# Patient Record
Sex: FEMALE | Race: WHITE | NOT HISPANIC OR LATINO | Employment: UNEMPLOYED | URBAN - METROPOLITAN AREA
[De-identification: names, ages, dates, MRNs, and addresses within clinical notes are randomized per-mention and may not be internally consistent; named-entity substitution may affect disease eponyms.]

---

## 2017-04-07 ENCOUNTER — HOSPITAL ENCOUNTER (INPATIENT)
Facility: HOSPITAL | Age: 27
LOS: 4 days | Discharge: HOME | End: 2017-04-11
Attending: SPECIALIST | Admitting: SPECIALIST

## 2017-04-07 ENCOUNTER — HOSPITAL ENCOUNTER (EMERGENCY)
Facility: HOSPITAL | Age: 27
End: 2017-04-07
Attending: EMERGENCY MEDICINE | Admitting: EMERGENCY MEDICINE

## 2017-04-07 VITALS
OXYGEN SATURATION: 98 % | TEMPERATURE: 98.1 F | RESPIRATION RATE: 16 BRPM | DIASTOLIC BLOOD PRESSURE: 65 MMHG | HEIGHT: 63 IN | WEIGHT: 190 LBS | HEART RATE: 75 BPM | SYSTOLIC BLOOD PRESSURE: 103 MMHG | BODY MASS INDEX: 33.66 KG/M2

## 2017-04-07 DIAGNOSIS — T19.2XXA RETAINED TAMPON, INITIAL ENCOUNTER: ICD-10-CM

## 2017-04-07 DIAGNOSIS — R45.851 DEPRESSION WITH SUICIDAL IDEATION: Primary | ICD-10-CM

## 2017-04-07 DIAGNOSIS — F32.A DEPRESSION WITH SUICIDAL IDEATION: Primary | ICD-10-CM

## 2017-04-07 PROBLEM — F33.2 SEVERE RECURRENT MAJOR DEPRESSION WITHOUT PSYCHOTIC FEATURES (HCC): Status: ACTIVE | Noted: 2017-04-07

## 2017-04-07 LAB
AMPHET+METHAMPHET UR QL: NEGATIVE
BARBITURATES UR QL SCN: NEGATIVE
BENZODIAZ UR QL SCN: NEGATIVE
CANNABINOIDS SERPL QL: POSITIVE
COCAINE UR QL: NEGATIVE
ETHANOL BLD-MCNC: <10 MG/DL (ref 0–10)
ETHANOL UR QL: <0.01 %
HCG SERPL QL: NEGATIVE
HOLD SPECIMEN: NORMAL
METHADONE UR QL SCN: NEGATIVE
OPIATES UR QL: NEGATIVE
OXYCODONE UR QL SCN: NEGATIVE

## 2017-04-07 RX ORDER — CETIRIZINE HYDROCHLORIDE 10 MG/1
10 TABLET ORAL DAILY
COMMUNITY

## 2017-04-07 RX ORDER — LORAZEPAM 1 MG/1
2 TABLET ORAL
Status: DISCONTINUED | OUTPATIENT
Start: 2017-04-07 | End: 2017-04-11 | Stop reason: HOSPADM

## 2017-04-07 RX ORDER — ACETAMINOPHEN 325 MG/1
650 TABLET ORAL EVERY 4 HOURS PRN
Status: DISCONTINUED | OUTPATIENT
Start: 2017-04-07 | End: 2017-04-11 | Stop reason: HOSPADM

## 2017-04-07 RX ORDER — MAGNESIUM HYDROXIDE/ALUMINUM HYDROXICE/SIMETHICONE 120; 1200; 1200 MG/30ML; MG/30ML; MG/30ML
30 SUSPENSION ORAL EVERY 6 HOURS PRN
Status: DISCONTINUED | OUTPATIENT
Start: 2017-04-07 | End: 2017-04-11 | Stop reason: HOSPADM

## 2017-04-07 NOTE — ED PROVIDER NOTES
EMERGENCY DEPARTMENT ENCOUNTER    CHIEF COMPLAINT  Chief Complaint: Psychiatric Evaluation  History given by: Patient  History limited by:   Room Number: 04/04  PMD: No Known Provider      HPI:  Pt is a 27 y.o. female who presents for psychiatric evaluation. Pt reports that she has had SI for about 2 weeks ago. Pt reports a hx of SI, but reports this episode is worse. Pt denies any plan and previous self-injury. She reports a hx of depression. Pt reports that she has a tampon that got stuck sideways yesterday and she has been unable to remove with mild abd cramps. She denies any other medical complaint at this time.    Duration:  2 weeks  Onset: gradual  Timing: waxing and waning  Quality: suicidal ideation w/o plan  Intensity/Severity: moderate  Progression: worsening  Associated Symptoms: abd pain, depression  Aggravating Factors: none  Alleviating Factors: none  Previous Episodes: hx of depression and SI  Treatment before arrival: none    PAST MEDICAL HISTORY  Active Ambulatory Problems     Diagnosis Date Noted   • Severe recurrent major depression without psychotic features 04/07/2017     Resolved Ambulatory Problems     Diagnosis Date Noted   • No Resolved Ambulatory Problems     Past Medical History:   Diagnosis Date   • Depression        PAST SURGICAL HISTORY  History reviewed. No pertinent surgical history.    FAMILY HISTORY  Family History   Problem Relation Age of Onset   • Depression Mother    • Alcohol abuse Father    • Suicide Attempts Maternal Grandfather        SOCIAL HISTORY  Social History     Social History   • Marital status:      Spouse name: N/A   • Number of children: N/A   • Years of education: N/A     Occupational History   • Not on file.     Social History Main Topics   • Smoking status: Current Every Day Smoker     Packs/day: 0.50     Types: Cigarettes   • Smokeless tobacco: Not on file   • Alcohol use Yes      Comment: has recently significantly decreased her intake   • Drug use:  No   • Sexual activity: Yes     Partners: Male     Other Topics Concern   • Not on file     Social History Narrative   • No narrative on file       ALLERGIES  Review of patient's allergies indicates no known allergies.    REVIEW OF SYSTEMS  Review of Systems   Constitutional: Negative for fever.   HENT: Negative for sore throat.    Eyes: Negative.    Respiratory: Negative for cough and shortness of breath.    Cardiovascular: Negative for chest pain.   Gastrointestinal: Positive for abdominal pain. Negative for diarrhea and vomiting.   Genitourinary: Negative for dysuria.   Musculoskeletal: Negative for neck pain.   Skin: Negative for rash.   Allergic/Immunologic: Negative.    Neurological: Negative for weakness, numbness and headaches.   Hematological: Negative.    Psychiatric/Behavioral: Positive for suicidal ideas.        Depression   All other systems reviewed and are negative.      PHYSICAL EXAM  ED Triage Vitals   Temp Heart Rate Resp BP SpO2   04/07/17 1731 04/07/17 1731 04/07/17 1731 04/07/17 1739 04/07/17 1731   97.3 °F (36.3 °C) 89 18 116/84 99 %      Temp src Heart Rate Source Patient Position BP Location FiO2 (%)   04/07/17 1731 04/07/17 1731 -- -- --   Tympanic Monitor          Physical Exam   Constitutional: She is oriented to person, place, and time and well-developed, well-nourished, and in no distress. No distress.   HENT:   Head: Normocephalic and atraumatic.   Mouth/Throat: Oropharynx is clear and moist.   Eyes: EOM are normal. Pupils are equal, round, and reactive to light.   Neck: Normal range of motion. Neck supple.   Cardiovascular: Normal rate, regular rhythm and normal heart sounds.    Pulmonary/Chest: Effort normal and breath sounds normal. No respiratory distress.   Abdominal: Soft. There is no tenderness. There is no rebound and no guarding.   Genitourinary: Cervix normal.   Genitourinary Comments: Tampon in vaginal vault, RN present for exam.   Musculoskeletal: Normal range of motion. She  exhibits no edema.   Neurological: She is alert and oriented to person, place, and time. She has normal sensation and normal strength.   Skin: Skin is warm and dry. No rash noted.   Psychiatric: She exhibits a depressed mood.   Nursing note and vitals reviewed.      LAB RESULTS  Lab Results (last 24 hours)     Procedure Component Value Units Date/Time    Ethanol [[id number]] Collected:  04/07/17 1806    Specimen:  Blood Updated:  04/07/17 1829     Ethanol <10 mg/dL      Ethanol % <0.010 %     hCG, Serum, Qualitative [[id number]]  (Normal) Collected:  04/07/17 1807    Specimen:  Blood Updated:  04/07/17 1830     HCG Qualitative Negative    Urine Drug Screen [[id number]]  (Abnormal) Collected:  04/07/17 1808    Specimen:  Urine from Urine, Clean Catch Updated:  04/07/17 1836     Amphet/Methamphet, Screen Negative     Barbiturates Screen, Urine Negative     Benzodiazepine Screen, Urine Negative     Cocaine Screen, Urine Negative     Opiate Screen Negative     THC, Screen, Urine Positive (A)     Methadone Screen, Urine Negative     Oxycodone Screen, Urine Negative    Narrative:       Negative Thresholds For Drugs Screened:     Amphetamines               500 ng/ml   Barbiturates               200 ng/ml   Benzodiazepines            100 ng/ml   Cocaine                    300 ng/ml   Methadone                  300 ng/ml   Opiates                    300 ng/ml   Oxycodone                  100 ng/ml   THC                        50 ng/ml    The Normal Value for all drugs tested is negative. This report includes final unconfirmed screening results to be used for medical treatment purposes only. Unconfirmed results must not be used for non-medical purposes such as employment or legal testing. Clinical consideration should be applied to any drug of abuse test, particulary when unconfirmed results are used.          I ordered the above labs and reviewed the results    PROCEDURES  Procedures      PROGRESS AND CONSULTS  ED Course   6:35  PM  Called placed for ACCESS evaluation.   7:11 PM  Tampon removed without complications.  7:48 PM  Pt has been seen and evaluated by ACCESS. Recommends admission and will consult with psychiatrist on call for admission.   8:24 PM  Pt will be admitted to Dr. Valdez (Psychiatrist).  8:27 PM  Discussed pt with Dr. Darnell. Dr. Darnell has seen and evaluated pt and agrees with tx plan.     MEDICAL DECISION MAKING    MDM  Number of Diagnoses or Management Options  Depression with suicidal ideation:   Retained tampon, initial encounter:      Amount and/or Complexity of Data Reviewed  Clinical lab tests: reviewed           DIAGNOSIS  Final diagnoses:   Depression with suicidal ideation   Retained tampon, initial encounter       DISPOSITION  ADMISSION    Discussed treatment plan and reason for admission with pt/family and admitting physician.  Pt/family voiced understanding of the plan for admission for further testing/treatment as needed.         Latest Documented Vital Signs:  As of 8:40 PM  BP- 117/78 HR- 75 Temp- 97.3 °F (36.3 °C) (Tympanic) O2 sat- 95%    --  Documentation assistance provided by jc Perez for Lewis Carter PA-C.  Information recorded by the jc was done at my direction and has been verified and validated by me.       Dustin Sierra  04/07/17 2025       Dustin Sierra  04/07/17 2027       LAURA Lincoln  04/07/17 2040

## 2017-04-07 NOTE — ED NOTES
"Pt reports feeling suicidal but that she does not have a plan. \"I've been thinking about it enough that it's scaring me.\" Denies HI. Denies abuse. Denies hallucinations of any sort. Denies any new recent stressors.      present w pt.     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     Also reports a large tampon stuck in vagina since last night.             Pamela Gomez RN  04/07/17 1830    "

## 2017-04-07 NOTE — ED NOTES
Report given to kaitlynn santos     Pt changed into gown, has given a urine sample, security at bedside, warm blankets given      Pamela Gomez RN  04/07/17 9425

## 2017-04-08 RX ORDER — CETIRIZINE HYDROCHLORIDE 10 MG/1
10 TABLET ORAL DAILY
Status: DISCONTINUED | OUTPATIENT
Start: 2017-04-08 | End: 2017-04-11 | Stop reason: HOSPADM

## 2017-04-08 RX ORDER — ARIPIPRAZOLE 5 MG/1
5 TABLET ORAL DAILY
Status: DISCONTINUED | OUTPATIENT
Start: 2017-04-08 | End: 2017-04-09

## 2017-04-08 RX ORDER — NICOTINE 21 MG/24HR
1 PATCH, TRANSDERMAL 24 HOURS TRANSDERMAL DAILY
Status: DISCONTINUED | OUTPATIENT
Start: 2017-04-08 | End: 2017-04-11 | Stop reason: HOSPADM

## 2017-04-08 RX ADMIN — NICOTINE 1 PATCH: 14 PATCH TRANSDERMAL at 12:51

## 2017-04-08 RX ADMIN — CETIRIZINE HYDROCHLORIDE 10 MG: 10 TABLET, FILM COATED ORAL at 12:50

## 2017-04-08 RX ADMIN — ARIPIPRAZOLE 5 MG: 5 TABLET ORAL at 12:50

## 2017-04-08 NOTE — PSYCH
BEHAVIORAL HEALTH EVALUATION     DATE OF EVALUATION: 04/08/2017    HISTORY OF PRESENT ILLNESS: This is a 27-year-old white female admitted through the emergency room after being brought in by family after reporting increasing issues with depression and suicidal ideation with thoughts of overdosing on her home medications and/or deliberately causing a motor vehicle accident to kill herself. Symptoms have been worsening over the last several years with little overt treatment on an outpatient basis. Apparently she has been in therapy very briefly and has tried to be put on medications from her primary care doctor in the past, but she was only on them for about 2 months before stopping them because of either not having any benefit or from issues such as agitation that the Prozac was causing. Patient reports issues with decreased energy, poor sleep, appetite, poor focus, feelings of hopelessness, worthlessness, taking care of everybody else but myself and has begun to use alcohol in a more consistent basis, drinking something almost every night to go to sleep. No recreational drugs, nothing of that nature. However, she did test positive for marijuana. Overall, patient seems very tearful at times, depressed, with limited eye contact during the interview. She continues to report being depressed and feeling overwhelmed. Vague in SI response but no clear plan at this time. Lengthy discussion about symptoms was had, and it turns out patient is also having issues with impulsivity, irritability, sleep disturbance because of not having to sleep, erratic energy changes and mood swings. There is a concern in the family for bipolar disorder being masked by alcoholism because of both parents, and patient had a grandfather that killed himself. Patient is open to all aspects of care and treatment. Again, lengthy support and education were offered.     PAST MEDICAL HISTORY: Nothing of significance.     PAST PSYCHIATRIC HISTORY:  Recently started being seen by a therapist and has a history of being _____ but apparently has never been in anything more than just superficial, a couple of sessions each. No previous overt inpatient psychiatric care. No history of previous suicide attempt but does have a history of suicidal thoughts, long-standing. Drug use as noted above.     SOCIAL HISTORY: The patient is , has children at home. Has a good support network and has some college education.     FAMILY HISTORY: Mom and dad were both known alcoholics with mood disorder, suspected bipolar, with a grandfather that committed suicide.     ALLERGIES: No known drug allergies.     VITAL SIGNS: Blood pressure of 111/73, pulse 65, respiratory rate 16, temperature 97.2°F.     CURRENT MEDICATIONS:  1. Standard PRNs.  2. Detoxification PRNs.     MENTAL STATUS EXAMINATION: General appearance is a moderately well-groomed white female, fairly pleasant, cooperative, responsive to the interview process. Limited eye contact and tearful but responsive. Speech was clear and coherent. Mood was depressed with a congruent affect. Thought processes and content were grossly organized and linear. No overt evidence of psychosis. Patient still positive for vague SI but no plan. No HI. Patients memory was grossly intact. Associations were normal. Cognitive functioning was at baseline. She was alert and oriented x4. Insight and judgment are poor.     DIAGNOSTIC IMPRESSION: Most likely bipolar disorder, depressed, severe, without psychotic features.     RECOMMENDATIONS:  1. Will continue patient's admission for safety and stabilization for ongoing mood issues and suicidal ideations, especially with plan prior to admission.   2. Will start patient on Abilify 5 mg daily with plan to titrate up to a mood stabilization dose. Patient educated about potential side effects and complications, and she was in agreement.   3. Will encourage groups and activities as well as a family  session and individual session while in the hospital.   4. Patient will be seen by Internal Medicine for evaluation of her overall medical needs with multiple labs still pending.   5. Anticipate a stay of 3-5 days, depending on patients progress and response to treatment. Dr. Valdez to resume care upon his return.         Akil Hawk M.D.  SB:gloria  D:   04/08/2017 12:18:09  T:   04/08/2017 14:56:57  Job ID:   [id number]  Document ID:   [id number]  cc:

## 2017-04-08 NOTE — CONSULTS
Name: Delmis Casiano ADMIT: 2017   : 1990  PCP: No Known Provider    MRN: [id number] LOS: 1 days   AGE/SEX: 27 y.o. female  ROOM: [facility]     Chief Complaint: Depression  Referring Provider: Dr. Valdez  Reason for Consult: Medical management (tobacco use, allergic rhinitis, hyperlipidemia)    Subjective   Patient is a 27 y.o. female presented with increasing depression and suicidal ideation. She has a history of depression for several years with intermittent treatment.  I was consulted for medical evaluation and management, specifically for allergic rhinitis and hyperlipidemia.  The patient states that she does not follow currently with a primary care provider as she recently moved here from the [place] area.  She has not been on any medications.  She states that she has not had much past medical history aside from some seasonal allergies and hyperlipidemia which she states was discovered several years ago and apparently has been controlled by diet.  She has additionally had a couple episodes of hypotension and hypoglycemia several years ago which was idiopathic but her mother has also had this.  She denies having had any issue with this recently.    History of Present Illness    Past Medical History:   Diagnosis Date   • Depression      No past surgical history on file.  Family History   Problem Relation Age of Onset   • Depression Mother    • Alcohol abuse Father    • Suicide Attempts Maternal Grandfather      Social History   Substance Use Topics   • Smoking status: Current Every Day Smoker     Packs/day: 0.50     Types: Cigarettes   • Smokeless tobacco: Not on file   • Alcohol use Yes      Comment: has recently significantly decreased her intake     Prescriptions Prior to Admission   Medication Sig Dispense Refill Last Dose   • cetirizine (zyrTEC) 10 MG tablet Take 10 mg by mouth Daily.   2017 at Unknown time     Allergies:  Review of patient's allergies indicates no known  allergies.    Review of Systems   Constitutional: Negative for chills, fatigue and fever.   HENT: Negative for congestion, rhinorrhea and trouble swallowing.    Eyes: Negative for redness and visual disturbance.   Respiratory: Negative for cough, choking and shortness of breath.    Cardiovascular: Negative for chest pain, palpitations and leg swelling.   Gastrointestinal: Negative for abdominal pain, blood in stool, constipation, diarrhea, nausea and vomiting.   Endocrine: Negative for cold intolerance and heat intolerance.   Genitourinary: Negative for difficulty urinating, dysuria and hematuria.   Musculoskeletal: Negative for back pain and neck pain.   Skin: Negative for pallor and rash.   Neurological: Negative for dizziness, syncope, light-headedness and headaches.   Hematological: Negative for adenopathy. Does not bruise/bleed easily.   Psychiatric/Behavioral: Positive for dysphoric mood and suicidal ideas.        Objective    Vital Signs  Temp:  [97.2 °F (36.2 °C)-98.1 °F (36.7 °C)] 97.2 °F (36.2 °C)  Heart Rate:  [69-89] 87  Resp:  [16-18] 18  BP: (103-119)/(65-84) 108/75  SpO2:  [95 %-99 %] 99 %  on   ;   O2 Device: room air  Body mass index is 33.43 kg/(m^2).    Physical Exam   Constitutional: She is oriented to person, place, and time. No distress.   HENT:   Head: Normocephalic and atraumatic.   Mouth/Throat: Oropharynx is clear and moist.   Eyes: Conjunctivae and EOM are normal. Pupils are equal, round, and reactive to light. No scleral icterus.   Neck: Normal range of motion. Neck supple. No JVD present.   Cardiovascular: Normal rate, regular rhythm and intact distal pulses.    Pulmonary/Chest: Effort normal and breath sounds normal.   Abdominal: Soft. Bowel sounds are normal. There is no tenderness.   Musculoskeletal: She exhibits no edema or tenderness.   Neurological: She is alert and oriented to person, place, and time. No cranial nerve deficit. She exhibits normal muscle tone.   Skin: Skin is warm  and dry. No rash noted. She is not diaphoretic.   Psychiatric: She has a normal mood and affect. Her behavior is normal.   Nursing note and vitals reviewed.      Results Review:   I reviewed the patient's new clinical results.    Assessment/Plan     Active Problems:    Severe recurrent major depression without psychotic features  Hyperlipidemia  Obesity  Tobacco use  Allergic rhinitis    Assessment & Plan  Will check basic labs in the AM. Nicotine patch ordered for dependence.  Can start zyrtec and/or nasal saline if allergic rhinitis becomes a major issue but will hold off for now.      Thank you very much for this consult.  I will see the patient as needed.       I discussed the patients findings and my recommendations with patient.        Westley Brasher MD  [facility]  04/08/17  9:36 AM

## 2017-04-08 NOTE — PLAN OF CARE
Problem:  Patient Care Overview (Adult)  Goal: Plan of Care Review  Outcome: Ongoing (interventions implemented as appropriate)    04/08/17 1551   Coping/Psychosocial Response Interventions   Plan Of Care Reviewed With patient   Coping/Psychosocial   Patient Agreement with Plan of Care agrees   Outcome Evaluation   Outcome Summary/Follow up Plan The patient rated her anxiety at a 4/10 and her depression at a 6/10 stating one of her main stressors is not getting to see her kids. The patient reports she feels better since being here, but that is because she is not around her other stressors at home. The patient denied any SI, HI, or hallucinations. The patient complained of some back pain, but said this is baseline for her and she did not need any PRN Tylenol. CIAW continued. No withdrawl symptoms. Cooperative with medications. Attending groups. Continuing to monitor.    Patient Care Overview   Progress improving       Goal: Interdisciplinary Rounds/Family Conference  Outcome: Ongoing (interventions implemented as appropriate)  Goal: Individualization and Mutuality  Outcome: Ongoing (interventions implemented as appropriate)  Goal: Discharge Needs Assessment  Outcome: Ongoing (interventions implemented as appropriate)    Problem:  Overarching Goals  Goal: Adheres to Safety Considerations for Self and Others  Outcome: Ongoing (interventions implemented as appropriate)  Intervention: Develop/maintain Individualized Safety Plan    04/08/17 0908 04/08/17 1551   Safety   Safety Measures --  safety rounds completed;suicide assessment completed   Provide Emotional/Physical Safety   Suicidal Ideation no --    Willingness to Contact Staff Member if Feeling Like Hurting Self yes --    Mental Health Homicide Risk   Willingness to Contact Staff Member if Feeling Like Hurting Others yes --          Goal: Optimized Coping Skills in Response to Life Stressors  Outcome: Ongoing (interventions implemented as  appropriate)  Intervention: Promote Effective Coping Strategies    04/08/17 0908   Coping Strategies   Supportive Measures active listening utilized;relaxation techniques promoted;self-care encouraged;self-reflection promoted;self-responsibility promoted;verbalization of feelings encouraged         Goal: Develops/Participates in Therapeutic Dexter to Support Successful Transition  Outcome: Ongoing (interventions implemented as appropriate)  Intervention: Foster Therapeutic Dexter    04/08/17 0908   Coping Strategies   Trust Relationship/Rapport care explained;choices provided;emotional support provided;empathic listening provided;questions answered;questions encouraged;reassurance provided;thoughts/feelings acknowledged           Problem: Suicide Risk (Adult,Obstetrics,Pediatric)  Goal: Identify Related Risk Factors and Signs and Symptoms  Outcome: Outcome(s) achieved Date Met:  04/08/17 04/08/17 1551   Suicide Risk   Suicide Risk: Related Risk Factors family mental health Hx;family suicide history;mental health diagnosis;multiple stressors;substance use/abuse   Signs and Symptoms (Suicide Risk) suicidal ideation/intent/plan         04/08/17 1551   Suicide Risk   Suicide Risk: Related Risk Factors family mental health Hx;family suicide history;mental health diagnosis;multiple stressors;substance use/abuse   Signs and Symptoms (Suicide Risk) suicidal ideation/intent/plan;substance use/abuse       Goal: Strength-Based Wellness/Recovery  Outcome: Ongoing (interventions implemented as appropriate)    04/08/17 1551   Suicide Risk (Adult,Obstetrics,Pediatric)   Strength-Based Wellness/Recovery making progress toward outcome       Goal: Physical Safety  Outcome: Ongoing (interventions implemented as appropriate)    04/08/17 1551   Suicide Risk (Adult,Obstetrics,Pediatric)   Physical Safety making progress toward outcome
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
## 2017-04-08 NOTE — ED PROVIDER NOTES
Pt is a 27 y.o. female who presents for psychiatric evaluation, reporting that she has had SI for about 2 weeks. Pt reports a hx of SI and depression, but reports this episode is worse. Pt denies any plan or previous self-injury. Pt also states that she has a tampon that got stuck sideways yesterday and she has been unable to remove with mild abd cramps. Pt has no other complaints at this time.     PE:  Resting comfortably, no distress  Vitals stable  A&O x3    Plan to admit to behavioral health.     I supervised care provided by the midlevel provider Lewis Carter PA-C.    We have discussed this patient's history, physical exam, and treatment plan.   I have reviewed the note and personally saw and examined the patient and agree with the plan of care.    Documentation assistance provided by jc Vasquez for Dr. Darnell.  Information recorded by the scribe was done at my direction and has been verified and validated by me.         Rosa Elena Vasquez  04/07/17 9052       Akil Darnell MD  04/08/17 2925

## 2017-04-08 NOTE — PLAN OF CARE
Problem:  Patient Care Overview (Adult)  Goal: Plan of Care Review  Outcome: Ongoing (interventions implemented as appropriate)    04/08/17 0325   Coping/Psychosocial Response Interventions   Plan Of Care Reviewed With patient   Coping/Psychosocial   Patient Agreement with Plan of Care agrees   Outcome Evaluation   Outcome Summary/Follow up Plan [facility] worsening DPN over the course of one year (post- father's death). She reports poor sleep, appetitie, and increasing SI thoughts w/ details that have begun to scare her. She had been drinking 6 or more drinks over the last yr, but reports stopping one week ago, only to have one glass of wine yesterday. She is being monitored for withdrawal symptoms. Denies other drug use but UDS + THC. First admission, shows insight, and has support nimco . 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 Pt denies s/s of psychosis. She denies a plan of suiicide , but has begun to feel extremely concerned about the persistent thoughts. She is a voluntary pt and motivated for treatment. Will continue to monitor and support.        Goal: Individualization and Mutuality  Outcome: Ongoing (interventions implemented as appropriate)    04/08/17 0325   Behavioral Health Screens   Patient Personal Strengths appropriate judgment/decision making;expressive of emotions;family/social support;humor;intellectual cognitive skills;independent living skills;motivated for recovery;motivated for treatment         Problem:  Overarching Goals  Goal: Adheres to Safety Considerations for Self and Others  Intervention: Develop/maintain Individualized Safety Plan    04/08/17 0325   Safety   Safety Measures safety rounds completed;suicide check-in completed   Provide Emotional/Physical Safety   Suicidal Ideation yes   Suicide Plan/Availability intermittent SI that has become more detailed, but denies actual planning    Current Suicidal  Precipitating Factors increased this past year; post death of her father    Previous Suicide Attempt (describe) no   Willingness to Contact Staff Member if Feeling Like Hurting Self yes   Mental Health Homicide Risk   Homicidal Ideation no   Willingness to Contact Staff Member if Feeling Like Hurting Others yes         Goal: Optimized Coping Skills in Response to Life Stressors  Intervention: Promote Effective Coping Strategies    04/08/17 0325   Coping Strategies   Supportive Measures active listening utilized;verbalization of feelings encouraged

## 2017-04-09 LAB
ANION GAP SERPL CALCULATED.3IONS-SCNC: 13.1 MMOL/L
BASOPHILS # BLD AUTO: 0.03 10*3/MM3 (ref 0–0.2)
BASOPHILS NFR BLD AUTO: 0.4 % (ref 0–1.5)
BUN BLD-MCNC: 15 MG/DL (ref 6–20)
BUN/CREAT SERPL: 19.7 (ref 7–25)
CALCIUM SPEC-SCNC: 9.5 MG/DL (ref 8.6–10.5)
CHLORIDE SERPL-SCNC: 101 MMOL/L (ref 98–107)
CHOLEST SERPL-MCNC: 252 MG/DL (ref 0–200)
CO2 SERPL-SCNC: 24.9 MMOL/L (ref 22–29)
CREAT BLD-MCNC: 0.76 MG/DL (ref 0.57–1)
DEPRECATED RDW RBC AUTO: 46.7 FL (ref 37–54)
EOSINOPHIL # BLD AUTO: 0.31 10*3/MM3 (ref 0–0.7)
EOSINOPHIL NFR BLD AUTO: 3.6 % (ref 0.3–6.2)
ERYTHROCYTE [DISTWIDTH] IN BLOOD BY AUTOMATED COUNT: 13.8 % (ref 11.7–13)
GFR SERPL CREATININE-BSD FRML MDRD: 90 ML/MIN/1.73
GLUCOSE BLD-MCNC: 102 MG/DL (ref 65–99)
HBA1C MFR BLD: 5.37 % (ref 4.8–5.6)
HCT VFR BLD AUTO: 46.1 % (ref 35.6–45.5)
HDLC SERPL-MCNC: 44 MG/DL (ref 40–60)
HGB BLD-MCNC: 14.5 G/DL (ref 11.9–15.5)
IMM GRANULOCYTES # BLD: 0 10*3/MM3 (ref 0–0.03)
IMM GRANULOCYTES NFR BLD: 0 % (ref 0–0.5)
LDLC SERPL CALC-MCNC: 184 MG/DL (ref 0–100)
LDLC/HDLC SERPL: 4.17 {RATIO}
LYMPHOCYTES # BLD AUTO: 2.83 10*3/MM3 (ref 0.9–4.8)
LYMPHOCYTES NFR BLD AUTO: 33.3 % (ref 19.6–45.3)
MCH RBC QN AUTO: 28.9 PG (ref 26.9–32)
MCHC RBC AUTO-ENTMCNC: 31.5 G/DL (ref 32.4–36.3)
MCV RBC AUTO: 92 FL (ref 80.5–98.2)
MONOCYTES # BLD AUTO: 0.65 10*3/MM3 (ref 0.2–1.2)
MONOCYTES NFR BLD AUTO: 7.6 % (ref 5–12)
NEUTROPHILS # BLD AUTO: 4.69 10*3/MM3 (ref 1.9–8.1)
NEUTROPHILS NFR BLD AUTO: 55.1 % (ref 42.7–76)
PLATELET # BLD AUTO: 264 10*3/MM3 (ref 140–500)
PMV BLD AUTO: 10.1 FL (ref 6–12)
POTASSIUM BLD-SCNC: 4.4 MMOL/L (ref 3.5–5.2)
RBC # BLD AUTO: 5.01 10*6/MM3 (ref 3.9–5.2)
SODIUM BLD-SCNC: 139 MMOL/L (ref 136–145)
TRIGL SERPL-MCNC: 122 MG/DL (ref 0–150)
VLDLC SERPL-MCNC: 24.4 MG/DL (ref 5–40)
WBC NRBC COR # BLD: 8.51 10*3/MM3 (ref 4.5–10.7)

## 2017-04-09 PROCEDURE — 80048 BASIC METABOLIC PNL TOTAL CA: CPT | Performed by: INTERNAL MEDICINE

## 2017-04-09 PROCEDURE — 83036 HEMOGLOBIN GLYCOSYLATED A1C: CPT | Performed by: INTERNAL MEDICINE

## 2017-04-09 PROCEDURE — 80061 LIPID PANEL: CPT | Performed by: INTERNAL MEDICINE

## 2017-04-09 PROCEDURE — 85025 COMPLETE CBC W/AUTO DIFF WBC: CPT | Performed by: INTERNAL MEDICINE

## 2017-04-09 RX ORDER — TRAZODONE HYDROCHLORIDE 50 MG/1
50 TABLET ORAL NIGHTLY PRN
Status: DISCONTINUED | OUTPATIENT
Start: 2017-04-09 | End: 2017-04-11 | Stop reason: HOSPADM

## 2017-04-09 RX ORDER — ARIPIPRAZOLE 5 MG/1
5 TABLET ORAL NIGHTLY
Status: DISCONTINUED | OUTPATIENT
Start: 2017-04-10 | End: 2017-04-11 | Stop reason: HOSPADM

## 2017-04-09 RX ADMIN — TRAZODONE HYDROCHLORIDE 50 MG: 50 TABLET, FILM COATED ORAL at 20:58

## 2017-04-09 RX ADMIN — ARIPIPRAZOLE 5 MG: 5 TABLET ORAL at 08:03

## 2017-04-09 RX ADMIN — ACETAMINOPHEN 650 MG: 325 TABLET ORAL at 08:18

## 2017-04-09 RX ADMIN — CETIRIZINE HYDROCHLORIDE 10 MG: 10 TABLET, FILM COATED ORAL at 08:03

## 2017-04-09 RX ADMIN — NICOTINE 1 PATCH: 14 PATCH TRANSDERMAL at 08:05

## 2017-04-09 NOTE — PLAN OF CARE
Problem:  Patient Care Overview (Adult)  Goal: Plan of Care Review  Outcome: Ongoing (interventions implemented as appropriate)    04/08/17 2320 04/09/17 0321   Coping/Psychosocial Response Interventions   Plan Of Care Reviewed With patient --    Coping/Psychosocial   Patient Agreement with Plan of Care agrees --    Outcome Evaluation   Outcome Summary/Follow up Plan --  Pt has been cooperative this shift. She deniesSI, HI and hallucinations. Pt rates anxiety 3/10 and depression 5/10. Pt continues to be on CIWA with no signs of withdrawal. Will continue to monitor.         Problem:  Overarching Goals  Goal: Adheres to Safety Considerations for Self and Others  Outcome: Ongoing (interventions implemented as appropriate)  Goal: Optimized Coping Skills in Response to Life Stressors  Outcome: Ongoing (interventions implemented as appropriate)  Goal: Develops/Participates in Therapeutic [place] to Support Successful Transition  Outcome: Ongoing (interventions implemented as appropriate)

## 2017-04-09 NOTE — PLAN OF CARE
Problem:  Patient Care Overview (Adult)  Goal: Plan of Care Review  Outcome: Ongoing (interventions implemented as appropriate)    04/09/17 1525   Coping/Psychosocial Response Interventions   Plan Of Care Reviewed With patient   Coping/Psychosocial   Patient Agreement with Plan of Care agrees   Outcome Evaluation   Outcome Summary/Follow up Plan The patient rated her anxiety at a 6/10 and her depression at a 2/10 stating her main stressor as poor sleep and not seeing her kids. Abilify changed to night time dose and PRN trazadone added. The patient denied any SI, HI, or hallucinations. The patient complained of some back pain and reported no relief from PRN Tylenol. The patient voiced she feels she is ready for discharge. CIAW continued. No withdrawl symptoms. Cooperative with medications. Attending groups. Continuing to monitor.    Patient Care Overview   Progress improving       Goal: Interdisciplinary Rounds/Family Conference  Outcome: Ongoing (interventions implemented as appropriate)  Goal: Individualization and Mutuality  Outcome: Ongoing (interventions implemented as appropriate)  Goal: Discharge Needs Assessment  Outcome: Ongoing (interventions implemented as appropriate)    Problem:  Overarching Goals  Goal: Adheres to Safety Considerations for Self and Others  Outcome: Ongoing (interventions implemented as appropriate)  Intervention: Develop/maintain Individualized Safety Plan    04/09/17 0803   Safety   Safety Measures safety rounds completed;suicide assessment completed   Provide Emotional/Physical Safety   Suicidal Ideation no   Willingness to Contact Staff Member if Feeling Like Hurting Self yes   Mental Health Homicide Risk   Homicidal Ideation no   Willingness to Contact Staff Member if Feeling Like Hurting Others yes         Goal: Optimized Coping Skills in Response to Life Stressors  Outcome: Ongoing (interventions implemented as appropriate)  Intervention: Promote Effective Coping Strategies     04/09/17 0803   Coping Strategies   Supportive Measures active listening utilized;relaxation techniques promoted;self-care encouraged;self-reflection promoted;self-responsibility promoted;verbalization of feelings encouraged         Goal: Develops/Participates in Therapeutic [place] to Support Successful Transition  Outcome: Ongoing (interventions implemented as appropriate)  Intervention: Foster Therapeutic [place]    04/09/17 0803   Coping Strategies   Trust Relationship/Rapport care explained;choices provided;emotional support provided;empathic listening provided;questions answered;questions encouraged;reassurance provided;thoughts/feelings acknowledged           Problem: Suicide Risk (Adult,Obstetrics,Pediatric)  Goal: Strength-Based Wellness/Recovery  Outcome: Ongoing (interventions implemented as appropriate)    04/09/17 1525   Suicide Risk (Adult,Obstetrics,Pediatric)   Strength-Based Wellness/Recovery making progress toward outcome       Goal: Physical Safety  Outcome: Ongoing (interventions implemented as appropriate)    04/09/17 1525   Suicide Risk (Adult,Obstetrics,Pediatric)   Physical Safety making progress toward outcome

## 2017-04-09 NOTE — PROGRESS NOTES
Name: Delmis Casiano ADMIT: 2017   : 1990  PCP: No Known Provider    MRN: [id number] LOS: 2 days   AGE/SEX: 27 y.o. female  ROOM: [facility]4/1   CC: depression  Subjective   No acute events overnight.  Patient reports generally feeling much better.  No new issues.    ROS: General: no fever, no chills, cardiac: no chest pain, no dyspnea, respiratory: no cough, no dyspnea, GI: no nausea, no vomiting, no diarrhea    Objective   Vital Signs  Temp:  [97.4 °F (36.3 °C)] 97.4 °F (36.3 °C)  Heart Rate:  [64-94] 94  Resp:  [16-18] 16  BP: ()/(64-85) 112/78  SpO2:  [98 %-99 %] 98 %  on   ;   O2 Device: room air  Body mass index is 33.43 kg/(m^2).  General: Alert, no distress  Head: NCAT  Eyes: EOMI, PERRL, conjunctivae normal  Mouth/throat: Oropharynx clear and moist  Neck: Supple, no JVD  Lungs: Normal effort, clear to ascultation  Cardiac: Normal rate, regular rhythm  Abdomen: Soft, nontender, and nondistended. Normoactive bowel sounds  Extremities: Warm and well perfused without edema.   Musculoskeletal:No deformity, no tenderness  Skin: Warm and dry without rash.   Neuro: Oriented x3. No focal deficits.  Psych: Appropriate mood and affect  ObjectiveResults Review:       I reviewed the patient's new clinical results.    Results from last 7 days  Lab Units 17  0401   WBC 10*3/mm3 8.51   HEMOGLOBIN g/dL 14.5   PLATELETS 10*3/mm3 264     Results from last 7 days  Lab Units 17  0401   SODIUM mmol/L 139   POTASSIUM mmol/L 4.4   CHLORIDE mmol/L 101   TOTAL CO2 mmol/L 24.9   BUN mg/dL 15   CREATININE mg/dL 0.76   GLUCOSE mg/dL 102*   Estimated Creatinine Clearance: 113.3 mL/min (by C-G formula based on Cr of 0.76).  Results from last 7 days  Lab Units 17  0401   CALCIUM mg/dL 9.5         [START ON 4/10/2017] ARIPiprazole 5 mg Oral Nightly   cetirizine 10 mg Oral Daily   nicotine 1 patch Transdermal Daily      Diet Regular      Assessment/Plan   Assessment:     Active Hospital Problems (**  Indicates Principal Problem)    Diagnosis Date Noted   • Severe recurrent major depression without psychotic features [F33.2] 04/07/2017      Resolved Hospital Problems    Diagnosis Date Noted Date Resolved   No resolved problems to display.   Hyperlipidemia  Obesity  Tobacco use  Allergic rhinitis    Plan:   Patient has hyperlipidemia on labs, A1C okay. Renal function, CBC, and electrolytes okay.  Will not need to start any medication now, but I did  the patient on lifestyle modifications as well as the importance of follow up with a primary care provider.  RN asked to provide a list of PCPs on discharge.     Will sign off.  Please call/reconsult with any questions or concerns.      Disposition  Per primary team.      Westley Brasher MD  [facility]  04/09/17  4:26 PM

## 2017-04-09 NOTE — PROGRESS NOTES
Delmis Casiano  27 y.o.  3324/1  @MARIA EUGENIA@  Jameel Payne*    Subjective     Pt seen in dayroom. Pt reports tolerating the abilify, but did notice some fatigue with it being taken in the day time.  She reports mood is better, but is concerned if it is more from not being at home and the stressors there.  We discussed the need for a family session and time for the meds to be more effective.      Objective     Vitals:    04/09/17 1000   BP: 101/67   Pulse: 74   Resp: 16   Temp: 97.4 °F (36.3 °C)   SpO2: 99%       Lab Results (last 72 hours)     Procedure Component Value Units Date/Time    CBC & Differential [[id number]] Collected:  04/09/17 0401    Specimen:  Blood Updated:  04/09/17 0528    Narrative:       The following orders were created for panel order CBC & Differential.  Procedure                               Abnormality         Status                     ---------                               -----------         ------                     CBC Auto Differential[[id number]]         Abnormal            Final result                 Please view results for these tests on the individual orders.    CBC Auto Differential [[id number]]  (Abnormal) Collected:  04/09/17 0401    Specimen:  Blood Updated:  04/09/17 0528     WBC 8.51 10*3/mm3      RBC 5.01 10*6/mm3      Hemoglobin 14.5 g/dL      Hematocrit 46.1 (H) %      MCV 92.0 fL      MCH 28.9 pg      MCHC 31.5 (L) g/dL      RDW 13.8 (H) %      RDW-SD 46.7 fl      MPV 10.1 fL      Platelets 264 10*3/mm3      Neutrophil % 55.1 %      Lymphocyte % 33.3 %      Monocyte % 7.6 %      Eosinophil % 3.6 %      Basophil % 0.4 %      Immature Grans % 0.0 %      Neutrophils, Absolute 4.69 10*3/mm3      Lymphocytes, Absolute 2.83 10*3/mm3      Monocytes, Absolute 0.65 10*3/mm3      Eosinophils, Absolute 0.31 10*3/mm3      Basophils, Absolute 0.03 10*3/mm3      Immature Grans, Absolute 0.00 10*3/mm3     Basic Metabolic Panel [[id number]]  (Abnormal) Collected:  04/09/17 0401     Specimen:  Blood Updated:  04/09/17 0544     Glucose 102 (H) mg/dL      BUN 15 mg/dL      Creatinine 0.76 mg/dL      Sodium 139 mmol/L      Potassium 4.4 mmol/L      Chloride 101 mmol/L      CO2 24.9 mmol/L      Calcium 9.5 mg/dL      eGFR Non African Amer 90 mL/min/1.73      BUN/Creatinine Ratio 19.7     Anion Gap 13.1 mmol/L     Narrative:       GFR Normal >60  Chronic Kidney Disease <60  Kidney Failure <15    Lipid Panel [[id number]]  (Abnormal) Collected:  04/09/17 0401    Specimen:  Blood Updated:  04/09/17 0544     Total Cholesterol 252 (H) mg/dL      Triglycerides 122 mg/dL      HDL Cholesterol 44 mg/dL      LDL Cholesterol  184 (H) mg/dL      VLDL Cholesterol 24.4 mg/dL      LDL/HDL Ratio 4.17    Narrative:       Cholesterol Reference Ranges  (U.S. Department of Health and Human Services ATP III Classifications)    Desirable          <200 mg/dL  Borderline High    200-239 mg/dL  High Risk          >240 mg/dL      Triglyceride Reference Ranges  (U.S. Department of Health and Human Services ATP III Classifications)    Normal           <150 mg/dL  Borderline High  150-199 mg/dL  High             200-499 mg/dL  Very High        >500 mg/dL    HDL Reference Ranges  (U.S. Department of Health and Human Services ATP III Classifcations)    Low     <40 mg/dl (major risk factor for CHD)  High    >60 mg/dl ('negative' risk factor for CHD)        LDL Reference Ranges  (U.S. Department of Health and Human Services ATP III Classifcations)    Optimal          <100 mg/dL  Near Optimal     100-129 mg/dL  Borderline High  130-159 mg/dL  High             160-189 mg/dL  Very High        >189 mg/dL    Hemoglobin A1c [[id number]]  (Normal) Collected:  04/09/17 0401    Specimen:  Blood Updated:  04/09/17 0910     Hemoglobin A1C 5.37 %     Narrative:       Hemoglobin A1C Ranges:    Increased Risk for Diabetes  5.7% to 6.4%  Diabetes                     >= 6.5%  Diabetic Goal                < 7.0%            Current Facility-Administered  Medications:   •  acetaminophen (TYLENOL) tablet 650 mg, 650 mg, Oral, Q4H PRN, Akil Hawk MD, 650 mg at 04/09/17 0818  •  aluminum-magnesium hydroxide-simethicone (MAALOX/MYLANTA) suspension 30 mL, 30 mL, Oral, Q6H PRN, Akil Hawk MD  •  ARIPiprazole (ABILIFY) tablet 5 mg, 5 mg, Oral, Daily, Akil Hawk MD, 5 mg at 04/09/17 0803  •  cetirizine (zyrTEC) tablet 10 mg, 10 mg, Oral, Daily, Akil Hawk MD, 10 mg at 04/09/17 0803  •  LORazepam (ATIVAN) tablet 2 mg, 2 mg, Oral, Q2H PRN, Akil Hawk MD  •  magnesium hydroxide (MILK OF MAGNESIA) suspension 2400 mg/10mL 10 mL, 10 mL, Oral, Daily PRN, Akil Hawk MD  •  nicotine (NICODERM CQ) 14 MG/24HR patch 1 patch, 1 patch, Transdermal, Daily, Akil Hawk MD, 1 patch at 04/09/17 0805    Mental Status exam:    Appearance: alert, mod groomed, wf.  Concentration/Focus:  Improving   Behaviors:  Less tearful today  Speech:  clear  Mood :  Depressed, less  Affect:  kenn  Thought process:  org  Thought Content:  Less hopeless  Memory :  fair  Associations:  wnl  Cognitive function:  intact  Alert and oriented :  X 3  Suicidal Thoughts:  intermittent  Homicidal Thoughts:  Denied   Insight/Judgement:  Limited, some improvement     Assessment     Patient Active Problem List   Diagnosis   • Severe recurrent major depression without psychotic features       Continue with admission and tx plan.  Will change abilify to hs given fatigue issue, and still likely will need increased to more effective mood stabilizer dose soon.  Family session tomorrow.  Will add prn trazodone for sleep.  Pt's detox presentation minimal at this time.

## 2017-04-10 RX ADMIN — NICOTINE 1 PATCH: 14 PATCH TRANSDERMAL at 08:48

## 2017-04-10 RX ADMIN — ARIPIPRAZOLE 5 MG: 5 TABLET ORAL at 21:25

## 2017-04-10 RX ADMIN — CETIRIZINE HYDROCHLORIDE 10 MG: 10 TABLET, FILM COATED ORAL at 08:47

## 2017-04-10 NOTE — PROGRESS NOTES
Brighter, less overwhelmed, reporting reduced SI    Family session later today -- if all goes well expect am DC

## 2017-04-10 NOTE — PLAN OF CARE
Problem: [facility] Patient Care Overview (Adult)  Goal: Interdisciplinary Rounds/Family Conference  Outcome: Ongoing (interventions implemented as appropriate)    04/10/17 0942   Interdisciplinary Rounds/Family Conf   Summary treatment team reviewed plan of care. family session scheduled for 04/10. RISSA consult recommended. discharge plan includes psych follow up through [facility].   Participants ;pharmacy;psychiatrist;social work      Patient/Guardian Signature: __________________________________             Psychiatrist Signature: ______________________________________             Therapist Signature: ________________________________________              Nurse Signature: ___________________________________________              Staff Signature: ____________________________________________             Staff Signature: ____________________________________________              Staff Signature: ____________________________________________              Staff Signature:                                                                                                        Problem: Depression  Goal: Treatment Goal: Demonstrate behavioral control of depressive symptoms, verbalize feelings of improved mood/affect, and adopt new coping skills prior to discharge  Outcome: Ongoing (interventions implemented as appropriate)  Attending groups and classes.  Family session scheduled for 4/10/17.  Goal: Verbalize thoughts and feelings associated with:  Outcome: Ongoing (interventions implemented as appropriate)  Goal: Refrain from harming self  Outcome: Ongoing (interventions implemented as appropriate)  Goal: Refrain from isolation  Outcome: Ongoing (interventions implemented as appropriate)  Goal: Refrain from self-neglect  Outcome: Ongoing (interventions implemented as appropriate)  Goal: Attend and participate in unit activities, including therapeutic, recreational, and educational groups  Outcome: Ongoing (interventions  implemented as appropriate)  Goal: Complete daily ADLs, including personal hygiene independently, as able  Outcome: Ongoing (interventions implemented as appropriate)

## 2017-04-10 NOTE — PLAN OF CARE
Problem:  Patient Care Overview (Adult)  Goal: Plan of Care Review  Outcome: Ongoing (interventions implemented as appropriate)    04/10/17 0026 04/10/17 0323   Coping/Psychosocial Response Interventions   Plan Of Care Reviewed With patient --    Coping/Psychosocial   Patient Agreement with Plan of Care agrees --    Outcome Evaluation   Outcome Summary/Follow up Plan --  Pt has been cooperative this shift. She rates her anxiety 1/10, depression 2/10. She denies SI, HI, and pain. Patient still on CIWA. Will continue to monitor.          Problem:  Overarching Goals  Goal: Adheres to Safety Considerations for Self and Others  Outcome: Ongoing (interventions implemented as appropriate)  Goal: Optimized Coping Skills in Response to Life Stressors  Outcome: Ongoing (interventions implemented as appropriate)  Goal: Develops/Participates in Therapeutic [place] to Support Successful Transition  Outcome: Ongoing (interventions implemented as appropriate)

## 2017-04-10 NOTE — PLAN OF CARE
Problem:  Patient Care Overview (Adult)  Goal: Plan of Care Review  Outcome: Ongoing (interventions implemented as appropriate)    04/10/17 1530   Coping/Psychosocial Response Interventions   Plan Of Care Reviewed With patient   Coping/Psychosocial   Patient Agreement with Plan of Care agrees   Outcome Evaluation   Outcome Summary/Follow up Plan SW met with pt to discuss discharge plans. Pt demonstrated future forward thinking and denied S/I. Pt interested in follow up with therapy and medication management. Pt in agreement with referral to [facility]. Pt also expressed need for PCP and pediatrician for her children since [place]. Pt shared how she and her  have talked about importance of self care and she plans to go to therapy 1x a week and look into a parents day out program. Pt described a large, supportive extended family. SW will continue to follow.    Patient Care Overview   Progress improving

## 2017-04-10 NOTE — PLAN OF CARE
Problem:  Patient Care Overview (Adult)  Goal: Plan of Care Review  Outcome: Ongoing (interventions implemented as appropriate)    04/10/17 1324   Coping/Psychosocial Response Interventions   Plan Of Care Reviewed With patient   Coping/Psychosocial   Patient Agreement with Plan of Care agrees   Outcome Evaluation   Outcome Summary/Follow up Plan Pt rated depression 1 and denies anxiety, SI, HI, hallucinations, and pain. Pt wanting to pursue outpatient resources for continuity of care and made needs clear to . Pt recognizes mental health needs and is addressing them appropraitely with the right healthcare workers. Pt shows understanding and a desire to improve upon ncare. Pt is pleasaant, hopeful, and attending community. No signs of withdrawal, CIWA remain intact. Pt coperative with staff, treatment, medication, and therapy. Will continue to monitor for safety and encourage self advocacy and responsibility.    Patient Care Overview   Progress improving       Goal: Interdisciplinary Rounds/Family Conference  Outcome: Ongoing (interventions implemented as appropriate)  Goal: Individualization and Mutuality  Outcome: Ongoing (interventions implemented as appropriate)  Goal: Discharge Needs Assessment  Outcome: Ongoing (interventions implemented as appropriate)    Problem:  Overarching Goals  Goal: Adheres to Safety Considerations for Self and Others  Outcome: Ongoing (interventions implemented as appropriate)  Intervention: Develop/maintain Individualized Safety Plan    04/10/17 0922 04/10/17 1200 04/10/17 1324   Safety   Safety Measures --  safety rounds completed --    Provide Emotional/Physical Safety   Suicidal Ideation no --  --    Willingness to Contact Staff Member if Feeling Like Hurting Self --  --  yes   Mental Health Homicide Risk   Homicidal Ideation no --  --    Willingness to Contact Staff Member if Feeling Like Hurting Others yes --  --          Goal: Optimized Coping Skills in Response  to Life Stressors  Outcome: Ongoing (interventions implemented as appropriate)  Intervention: Promote Effective Coping Strategies    04/10/17 0922   Coping Strategies   Supportive Measures active listening utilized;goal setting facilitated;positive reinforcement provided;self-care encouraged;verbalization of feelings encouraged         Goal: Develops/Participates in Therapeutic [place] to Support Successful Transition  Outcome: Ongoing (interventions implemented as appropriate)  Intervention: Foster Therapeutic [place]    04/10/17 0922   Coping Strategies   Trust Relationship/Rapport care explained;choices provided;empathic listening provided;questions answered;questions encouraged;thoughts/feelings acknowledged       Intervention: Mutually Develop Transition Plan    04/10/17 0922   OTHER   Transition Support crisis management plan promoted           Problem: Suicide Risk (Adult,Obstetrics,Pediatric)  Intervention: Promote/Enhance Psychosocial Well-being    04/10/17 0922   Coping Strategies   Supportive Measures active listening utilized;goal setting facilitated;positive reinforcement provided;self-care encouraged;verbalization of feelings encouraged   Family/Support System Care self-care encouraged;support provided       Intervention: Assess Risk to Self/Maintain Safety    04/10/17 0922 04/10/17 1324   Provide Emotional/Physical Safety   Suicidal Ideation no --    Willingness to Contact Staff Member if Feeling Like Hurting Self --  yes   Coping/Psychosocial Interventions   Behavior Management --  behavioral plan reviewed         Goal: Strength-Based Wellness/Recovery  Outcome: Ongoing (interventions implemented as appropriate)    04/10/17 1324   Suicide Risk (Adult,Obstetrics,Pediatric)   Strength-Based Wellness/Recovery achieves outcome       Goal: Physical Safety  Outcome: Ongoing (interventions implemented as appropriate)    04/10/17 1324   Suicide Risk (Adult,Obstetrics,Pediatric)   Physical Safety achieves  outcome

## 2017-04-10 NOTE — SIGNIFICANT NOTE
"Met with pt and  \"Cali\" for family session. Pt had bright affect and shared had slept better, feels rested and had been taking the meds.  shared of support of pt going to a therapist, seeing a psychiatrist and taking care of self. Pt shared had stopped ETOH a week ago and how plans not to drink while taking the meds.  shared of own addiction issues and how had benefited from treatment. Both shared of family history of addiction. Pt and  also shared of multiple stressors including the death of pt's father Feb of 2016. Discussed the grief process, encouraged the couple to read about [employer] and encouraged follow-up care. Pt voiced interest in drawing with charcoal. Pt was given charcoal to use on [facility].   "

## 2017-04-11 VITALS
DIASTOLIC BLOOD PRESSURE: 82 MMHG | HEART RATE: 82 BPM | TEMPERATURE: 97.6 F | HEIGHT: 63 IN | BODY MASS INDEX: 33.43 KG/M2 | RESPIRATION RATE: 20 BRPM | OXYGEN SATURATION: 100 % | SYSTOLIC BLOOD PRESSURE: 110 MMHG | WEIGHT: 188.7 LBS

## 2017-04-11 RX ORDER — TRAZODONE HYDROCHLORIDE 50 MG/1
50 TABLET ORAL NIGHTLY PRN
Qty: 30 TABLET | Refills: 1 | Status: SHIPPED | OUTPATIENT
Start: 2017-04-11 | End: 2020-01-20

## 2017-04-11 RX ORDER — ARIPIPRAZOLE 5 MG/1
5 TABLET ORAL NIGHTLY
Qty: 30 TABLET | Refills: 1 | Status: SHIPPED | OUTPATIENT
Start: 2017-04-11 | End: 2020-01-20 | Stop reason: ALTCHOICE

## 2017-04-11 RX ORDER — NICOTINE 21 MG/24HR
1 PATCH, TRANSDERMAL 24 HOURS TRANSDERMAL DAILY
Qty: 7 PATCH | Refills: 0 | Status: SHIPPED | OUTPATIENT
Start: 2017-04-11 | End: 2020-01-20

## 2017-04-11 RX ADMIN — NICOTINE 1 PATCH: 14 PATCH TRANSDERMAL at 08:20

## 2017-04-11 RX ADMIN — CETIRIZINE HYDROCHLORIDE 10 MG: 10 TABLET, FILM COATED ORAL at 08:19

## 2017-04-11 RX ADMIN — ACETAMINOPHEN 650 MG: 325 TABLET ORAL at 08:23

## 2017-04-11 NOTE — PLAN OF CARE
Problem:  Patient Care Overview (Adult)  Goal: Plan of Care Review  Outcome: Ongoing (interventions implemented as appropriate)    04/11/17 0212 04/11/17 0423   Coping/Psychosocial Response Interventions   Plan Of Care Reviewed With patient --    Coping/Psychosocial   Patient Agreement with Plan of Care agrees --    Outcome Evaluation   Outcome Summary/Follow up Plan --  Pt has been pleasant and cooperative this shift. She denies anxiety, depression, SI, HI and pain. Pt states she is ready to go home. Will continue to monitor this shift.         Problem:  Overarching Goals  Goal: Adheres to Safety Considerations for Self and Others  Outcome: Ongoing (interventions implemented as appropriate)  Goal: Optimized Coping Skills in Response to Life Stressors  Outcome: Ongoing (interventions implemented as appropriate)  Goal: Develops/Participates in Therapeutic [place] to Support Successful Transition  Outcome: Ongoing (interventions implemented as appropriate)

## 2017-04-11 NOTE — PSYCH
PSYCHIATRIC DISCHARGE SUMMARY    DATE OF DISCHARGE:  04/11/2017     REASON FOR ADMISSION: The patient is a 27-year-old white female admitted to the [facility] with increasing suicidal thoughts and abuse of alcohol.     HOSPITAL COURSE: The patient was admitted to the [facility] and placed on suicide precautions. It was Dr. Hawk’s feeling that the patient was exhibiting symptoms of a bipolar spectrum disorder, and she was therefore begun on Abilify 5 mg daily. The patient tolerated the medication well, and she exhibited little signs and symptoms of withdrawal. By 04/11/2017, the patient was in bright spirits and denied suicidal ideation.  Discharge was ordered.     DISCHARGE DIAGNOSES:  1.  Bipolar disorder, most recent episode depressed.   2.  Alcohol use disorder.     DISCHARGE MEDICATIONS:  1.  Abilify 5 mg daily for mood stabilization.   2.  Cetirizine 10 mg daily for environmental allergies.   3.  Nicoderm CQ 14 mg q.24 h. p.r.n. for smoking cessation. The importance of smoking cessation was discussed with the patient at the time of discharge.     FOLLOWUP:  Followup will take place through the auspices of [facility] mental health resources.     PROGNOSIS: The patient's prognosis is considered fair. The patient was informed of the risks and benefits of medications ordered including the possible risk of tardive dyskinesia with prolonged use of antipsychotic medication such as Abilify. She is likewise apprised of FDA-mandated warnings regarding the effects of atypical antipsychotics on the metabolism of lipids and glucose.        Jameel Valdez M.D.  DANIELE:Mario  D:   04/11/2017 10:50:56  T:   04/11/2017 11:13:00  Job ID:   [id number]  Document ID:   [id number]  cc:   (no pcp on file)

## 2017-04-11 NOTE — PROGRESS NOTES
Per orders, pt is discharging today to home.  Pt will follow up with [facility] [phone]) 4/18/17 @ 12:00pm w/ brigid Ha and 5/10/17 @ 12:15pm w/ Cony NEWMAN.  Pt given resources for PCP under passport, IOP options r/t insurance, and mental health support groups in the area.  DC summary faxed to [facility]

## 2017-04-13 ENCOUNTER — TELEPHONE (OUTPATIENT)
Dept: PSYCHIATRY | Facility: HOSPITAL | Age: 27
End: 2017-04-13

## 2017-04-13 NOTE — TELEPHONE ENCOUNTER
"Patient reports she is \"readjusting to being back home with the kids.\"  She states she was able to fill her scripts and verbalizes an understanding of her discharge instructions.  No further assistance requested at this time.  "

## 2020-01-20 ENCOUNTER — HOSPITAL ENCOUNTER (INPATIENT)
Facility: HOSPITAL | Age: 30
LOS: 3 days | Discharge: HOME | End: 2020-01-23
Attending: SPECIALIST | Admitting: SPECIALIST

## 2020-01-20 ENCOUNTER — HOSPITAL ENCOUNTER (EMERGENCY)
Facility: HOSPITAL | Age: 30
End: 2020-01-20
Attending: EMERGENCY MEDICINE

## 2020-01-20 VITALS
BODY MASS INDEX: 33.43 KG/M2 | HEIGHT: 63 IN | RESPIRATION RATE: 16 BRPM | OXYGEN SATURATION: 98 % | SYSTOLIC BLOOD PRESSURE: 104 MMHG | TEMPERATURE: 98.7 F | DIASTOLIC BLOOD PRESSURE: 72 MMHG | HEART RATE: 82 BPM

## 2020-01-20 DIAGNOSIS — R45.851 SUICIDAL IDEATION: Primary | ICD-10-CM

## 2020-01-20 PROBLEM — F31.81 BIPOLAR 2 DISORDER, MAJOR DEPRESSIVE EPISODE (HCC): Status: ACTIVE | Noted: 2020-01-20

## 2020-01-20 LAB
ALBUMIN SERPL-MCNC: 4.5 G/DL (ref 3.5–5.2)
ALBUMIN/GLOB SERPL: 1.7 G/DL
ALP SERPL-CCNC: 48 U/L (ref 39–117)
ALT SERPL W P-5'-P-CCNC: 11 U/L (ref 1–33)
AMPHET+METHAMPHET UR QL: NEGATIVE
ANION GAP SERPL CALCULATED.3IONS-SCNC: 14.9 MMOL/L (ref 5–15)
AST SERPL-CCNC: 11 U/L (ref 1–32)
BARBITURATES UR QL SCN: NEGATIVE
BENZODIAZ UR QL SCN: NEGATIVE
BILIRUB SERPL-MCNC: 0.2 MG/DL (ref 0.2–1.2)
BUN BLD-MCNC: 12 MG/DL (ref 6–20)
BUN/CREAT SERPL: 18.8 (ref 7–25)
CALCIUM SPEC-SCNC: 9 MG/DL (ref 8.6–10.5)
CANNABINOIDS SERPL QL: POSITIVE
CHLORIDE SERPL-SCNC: 105 MMOL/L (ref 98–107)
CO2 SERPL-SCNC: 21.1 MMOL/L (ref 22–29)
COCAINE UR QL: NEGATIVE
CREAT BLD-MCNC: 0.64 MG/DL (ref 0.57–1)
ETHANOL BLD-MCNC: <10 MG/DL (ref 0–10)
ETHANOL UR QL: <0.01 %
GFR SERPL CREATININE-BSD FRML MDRD: 108 ML/MIN/1.73
GLOBULIN UR ELPH-MCNC: 2.6 GM/DL
GLUCOSE BLD-MCNC: 93 MG/DL (ref 65–99)
METHADONE UR QL SCN: NEGATIVE
OPIATES UR QL: NEGATIVE
OXYCODONE UR QL SCN: NEGATIVE
POTASSIUM BLD-SCNC: 4.2 MMOL/L (ref 3.5–5.2)
PROT SERPL-MCNC: 7.1 G/DL (ref 6–8.5)
SODIUM BLD-SCNC: 141 MMOL/L (ref 136–145)
T4 FREE SERPL-MCNC: 0.96 NG/DL (ref 0.93–1.7)
TSH SERPL DL<=0.05 MIU/L-ACNC: 0.9 UIU/ML (ref 0.27–4.2)

## 2020-01-20 PROCEDURE — 80307 DRUG TEST PRSMV CHEM ANLYZR: CPT | Performed by: EMERGENCY MEDICINE

## 2020-01-20 PROCEDURE — 80053 COMPREHEN METABOLIC PANEL: CPT | Performed by: SPECIALIST

## 2020-01-20 PROCEDURE — 84443 ASSAY THYROID STIM HORMONE: CPT | Performed by: SPECIALIST

## 2020-01-20 PROCEDURE — 84439 ASSAY OF FREE THYROXINE: CPT | Performed by: SPECIALIST

## 2020-01-20 PROCEDURE — 80307 DRUG TEST PRSMV CHEM ANLYZR: CPT

## 2020-01-20 PROCEDURE — 90791 PSYCH DIAGNOSTIC EVALUATION: CPT

## 2020-01-20 RX ORDER — ALUMINA, MAGNESIA, AND SIMETHICONE 2400; 2400; 240 MG/30ML; MG/30ML; MG/30ML
15 SUSPENSION ORAL EVERY 6 HOURS PRN
Status: DISCONTINUED | OUTPATIENT
Start: 2020-01-20 | End: 2020-01-23 | Stop reason: HOSPADM

## 2020-01-20 RX ORDER — CETIRIZINE HYDROCHLORIDE 10 MG/1
10 TABLET ORAL DAILY
Status: DISCONTINUED | OUTPATIENT
Start: 2020-01-21 | End: 2020-01-20

## 2020-01-20 RX ORDER — TRAZODONE HYDROCHLORIDE 100 MG/1
100 TABLET ORAL NIGHTLY PRN
Status: ON HOLD | COMMUNITY
End: 2020-01-23 | Stop reason: SDUPTHER

## 2020-01-20 RX ORDER — LAMOTRIGINE 100 MG/1
100 TABLET ORAL DAILY
Status: ON HOLD | COMMUNITY
End: 2020-01-23 | Stop reason: SDUPTHER

## 2020-01-20 RX ORDER — BUPROPION HYDROCHLORIDE 300 MG/1
300 TABLET ORAL DAILY
Status: DISCONTINUED | OUTPATIENT
Start: 2020-01-21 | End: 2020-01-20

## 2020-01-20 RX ORDER — LAMOTRIGINE 100 MG/1
100 TABLET ORAL DAILY
Status: DISCONTINUED | OUTPATIENT
Start: 2020-01-20 | End: 2020-01-21

## 2020-01-20 RX ORDER — BUPROPION HYDROCHLORIDE 300 MG/1
300 TABLET ORAL DAILY
COMMUNITY
End: 2020-01-23 | Stop reason: HOSPADM

## 2020-01-20 RX ORDER — NICOTINE 21 MG/24HR
1 PATCH, TRANSDERMAL 24 HOURS TRANSDERMAL EVERY 24 HOURS
Status: DISCONTINUED | OUTPATIENT
Start: 2020-01-20 | End: 2020-01-21

## 2020-01-20 RX ORDER — CETIRIZINE HYDROCHLORIDE 10 MG/1
10 TABLET ORAL DAILY
Status: DISCONTINUED | OUTPATIENT
Start: 2020-01-20 | End: 2020-01-21

## 2020-01-20 RX ORDER — LAMOTRIGINE 100 MG/1
100 TABLET ORAL DAILY
Status: DISCONTINUED | OUTPATIENT
Start: 2020-01-21 | End: 2020-01-20

## 2020-01-20 RX ORDER — PROPRANOLOL HYDROCHLORIDE 20 MG/1
20 TABLET ORAL EVERY 12 HOURS PRN
Status: DISCONTINUED | OUTPATIENT
Start: 2020-01-20 | End: 2020-01-23

## 2020-01-20 RX ORDER — TRAZODONE HYDROCHLORIDE 50 MG/1
100 TABLET ORAL NIGHTLY PRN
Status: DISCONTINUED | OUTPATIENT
Start: 2020-01-20 | End: 2020-01-23 | Stop reason: HOSPADM

## 2020-01-20 RX ORDER — BUPROPION HYDROCHLORIDE 300 MG/1
300 TABLET ORAL DAILY
Status: DISCONTINUED | OUTPATIENT
Start: 2020-01-20 | End: 2020-01-21

## 2020-01-20 RX ORDER — PROPRANOLOL HYDROCHLORIDE 10 MG/1
20 TABLET ORAL 2 TIMES DAILY PRN
COMMUNITY
End: 2020-01-23 | Stop reason: HOSPADM

## 2020-01-20 RX ORDER — ACETAMINOPHEN 325 MG/1
650 TABLET ORAL EVERY 4 HOURS PRN
Status: DISCONTINUED | OUTPATIENT
Start: 2020-01-20 | End: 2020-01-23 | Stop reason: HOSPADM

## 2020-01-20 RX ADMIN — LAMOTRIGINE 100 MG: 100 TABLET ORAL at 21:56

## 2020-01-20 RX ADMIN — BUPROPION HYDROCHLORIDE 300 MG: 300 TABLET, EXTENDED RELEASE ORAL at 21:56

## 2020-01-20 RX ADMIN — NICOTINE 1 PATCH: 21 PATCH, EXTENDED RELEASE TRANSDERMAL at 21:55

## 2020-01-20 RX ADMIN — CETIRIZINE HYDROCHLORIDE 10 MG: 10 TABLET, FILM COATED ORAL at 21:56

## 2020-01-20 RX ADMIN — TRAZODONE HYDROCHLORIDE 100 MG: 50 TABLET ORAL at 21:56

## 2020-01-20 NOTE — PLAN OF CARE
Problem: Patient Care Overview  Goal: Plan of Care Review  Outcome: Ongoing (interventions implemented as appropriate)  Goal: Individualization and Mutuality  Outcome: Ongoing (interventions implemented as appropriate)  Goal: Discharge Needs Assessment  Outcome: Ongoing (interventions implemented as appropriate)  Goal: Interprofessional Rounds/Family Conf  Outcome: Ongoing (interventions implemented as appropriate)     Problem: Overarching Goals (Adult)  Goal: Adheres to Safety Considerations for Self and Others  Outcome: Ongoing (interventions implemented as appropriate)  Flowsheets (Taken 1/20/2020 1831)  Adheres to Safety Considerations for Self and Others: making progress toward outcome  Goal: Optimized Coping Skills in Response to Life Stressors  Outcome: Ongoing (interventions implemented as appropriate)  Flowsheets (Taken 1/20/2020 1831)  Optimized Coping Skills in Response to Life Stressors: making progress toward outcome  Goal: Develops/Participates in Therapeutic [place] to Support Successful Transition  Outcome: Ongoing (interventions implemented as appropriate)  Flowsheets (Taken 1/20/2020 1831)  Develops/Participates in Therapeutic [place] to Support Successful Transition: making progress toward outcome     Problem: Suicidal Behavior (Adult)  Goal: Suicidal Behavior is Absent/Minimized/Managed  Outcome: Ongoing (interventions implemented as appropriate)  Flowsheets (Taken 1/20/2020 1831)  Action Step/Short Term Goal (STG) Established: 1/20/2020  Suicidal Behavior Managed/Minimized Time Frame for Action Step (STG): 4 days  Suicidal Behavior Managed/Minimized Action Step (STG) Outcome: making progress toward outcome     Problem: Mood Impairment (Depressive Signs/Symptoms) (Adult)  Goal: Improved Mood Symptoms (Depressive Signs/Symptoms)  Outcome: Ongoing (interventions implemented as appropriate)  Flowsheets (Taken 1/20/2020 1831)  Improved Mood Symptoms Action Step/Short Term Goal (STG) Established:  1/20/2020  Improved Mood Symptoms Time Frame for Action Step (STG): 4 days  Improved Mood Symptoms Action Step (STG) Outcome: making progress toward outcome     Problem: Decreased Participation/Engagement (Depressive Signs/Symptoms) (Adult)  Goal: Increased Participation/Engagement (Depressive Signs/Symptoms)  Outcome: Ongoing (interventions implemented as appropriate)  Flowsheets (Taken 1/20/2020 1831)  Increased Participation/Engagement Action Step/Short Term Goal (STG) Established: 1/20/2020  Increased Participation/Engagement Time Frame for Action Step (STG): 4 days  Increased Participation/Engagement Action Step (STG) Outcome: making progress toward outcome     Problem: Sleep Impairment (Depressive Signs/Symptoms) (Adult)  Goal: Improved Sleep Hygiene (Depressive Signs/Symptoms)  Outcome: Ongoing (interventions implemented as appropriate)  Flowsheets (Taken 1/20/2020 1831)  Improved Sleep Hygiene Action Step/Short Term Goal (STG) Established: 1/20/2020  Improved Sleep Hygiene Time Frame for Action Step (STG): 4 days  Improved Sleep Hygiene Action Step (STG) Outcome: making progress toward outcome     Problem: Weight/Appetite Change (Depressive Signs/Symptoms) (Adult)  Goal: Nutrition/Weight Optimized (Depressive Signs/Symptoms)  Outcome: Ongoing (interventions implemented as appropriate)  Flowsheets (Taken 1/20/2020 1831)  Optimized Nutrition/Weight Action Step/Short Term Goal (STG) Established: 1/20/2020  Optimized Nutrition/Weight Time Frame for Action Step (STG): 4 days  Optimized Nutrition/Weight Action Step (STG) Outcome: making progress toward outcome

## 2020-01-20 NOTE — ED PROVIDER NOTES
The patient presents complaining of moderate SI. Pt admits to hx of bipolar disorder and states she has been taking medications as prescribed. Pt family states 4-5 weeks ago the pt mentioned that her medications seemed to not be as affective.     Physical Exam:  Patient is nontoxic appearing and in NAD. The pt is alert and oriented X 3.  HENT: normocephalic, atraumatic  Lungs/cardiovascular: The pt's heart is RRR without murmur. The pt's lungs are clear to auscultation.  Back/extremities: FROM, no pedal edema, no calf tenderness  Skin: warm and dry  Psych: Pt admits to suicidal thoughts but is not actively suicidal or homicidal.     Plan: Discussed plan to have ACCESS see the pt. Pt understands and agrees with the plan. All questions have been answered.      MD ATTESTATION NOTE    The TOVA and I have discussed this patient's history, physical exam, and treatment plan.  I have reviewed the documentation and personally had a face to face interaction with the patient. I affirm the documentation and agree with the treatment and plan.  The attached note describes my personal findings.    Documentation assistance provided by jc Cuenca for Dr Alfonso. Information recorded by the scribe was done at my direction and has been verified and validated by me.     Shaneka Cuenca  01/20/20 1395       Deacon Alfonso MD  01/20/20 2947

## 2020-01-20 NOTE — ED NOTES
"Pt arrives to ED with . Pt and pt  reports hx of bipolar. Pts mood swings have been worse over the past 2 weeks. Pts  suggested pts medications may need to be adjusted. Pt admits to feeling more depressed than usual and having suicidal thoughts with no intent on acting on them. Pt report having multiple plans but states \"I'd rather not say\". Pt is calm and cooperative. NAD.      Cassi Muro RN  01/20/20 2992    "

## 2020-01-20 NOTE — ED NOTES
Pt reports passive SI, thoughts of dying without specific plan x 2 weeks; reports hx bipolar II, currently taking medications as prescribed.  Pt aox4, NAD, responds appropriately to questions and directives.     Nomi Davis RN  01/20/20 8499

## 2020-01-20 NOTE — CONSULTS
"Pt is a 29 year old,  female seen today in ED 44. She was brought into the ED by her  of 7 years, Cali due to her declining mental health.     She lives at home with her  and two kids ages 5 and 7 years. 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 She was admitted to [facility] on 4/7/2017- 4/11/2017 for depression and SI along with ETOH use, where she was diagnosed with Bipolar II Disorder. Following this she has been following up with a PMHNP at [facility]. At one point she was following with a therapist there as well, however she reports she can no longer get appointments with them. She has been working with her provider to find the correct medication routine and had been doing really well on her combination of Wellbutrin  mg Daily and Lamictal 100 mg daily. However, the last month she has been feeling worse and worse, and for the last two weeks she has been \"scared to be alone\". She states that she has frequent, and almost constant SI. When asked about her plan, she becomes quiet and then states, \"There are literally fleeting options of ways to do it everywhere I look.\" She mentioned specifically jumping [place] front of a train [place], as well as overdosing on medications \"Because that would be the easiest for my family to deal with.\" She denies any previous suicide attempts or self harming behaviors, but states that she does not trust herself to maintain this.     She attempted to contact her provider, \"but she wasn't understanding what I was saying. This down, it's way too low, and it's been here way too long.\" She denies any HI. She denies any alcohol or substance use and has been sober since October of 2017. She does smoke cloves. She reports poor sleep at about 4 hours a night, and states her appetite is \"erratic\", one day she won't stop eating, others she won't eat anything. She voices concern over her \"severe anger outbursts,\" stating that now that " "she feels so down, anytime anything barely happens, she has trouble controlling her reaction. This has caused increasing difficulty for her to stay at home with her children.    Discussed with Dr. Valdez, who orders to admit pt to CMU for safety and stabilization. Pt will be on Gil one with Low suicide precautions. She will not require a sitter once on CMU. Current Medications will be continued. Patient will be ordered Nicotene patch.      She mentions that she has had poor responses/negative side effects to several medications in the past however does not elaborate on specifics:  Celexa  Abilify  Zoloft  Lithium  Prozac  Paxil  Vraylar  Risperdal  She also reports attempting to increase her Lamictal dose to 200 mg daily however \"It caused such a cognitive block that I couldn't deal with it.\"   "

## 2020-01-21 PROBLEM — J30.2 SEASONAL ALLERGIES: Status: ACTIVE | Noted: 2020-01-21

## 2020-01-21 PROBLEM — F12.90 MARIJUANA USE: Status: ACTIVE | Noted: 2020-01-21

## 2020-01-21 PROBLEM — Z72.0 TOBACCO ABUSE: Status: ACTIVE | Noted: 2020-01-21

## 2020-01-21 LAB
BASOPHILS # BLD AUTO: 0.03 10*3/MM3 (ref 0–0.2)
BASOPHILS NFR BLD AUTO: 0.5 % (ref 0–1.5)
BILIRUB UR QL STRIP: NEGATIVE
CLARITY UR: CLEAR
COLOR UR: YELLOW
DEPRECATED RDW RBC AUTO: 38.1 FL (ref 37–54)
EOSINOPHIL # BLD AUTO: 0.21 10*3/MM3 (ref 0–0.4)
EOSINOPHIL NFR BLD AUTO: 3.7 % (ref 0.3–6.2)
ERYTHROCYTE [DISTWIDTH] IN BLOOD BY AUTOMATED COUNT: 12.5 % (ref 12.3–15.4)
GLUCOSE UR STRIP-MCNC: NEGATIVE MG/DL
HCT VFR BLD AUTO: 35.9 % (ref 34–46.6)
HGB BLD-MCNC: 11.5 G/DL (ref 12–15.9)
HGB UR QL STRIP.AUTO: NEGATIVE
IMM GRANULOCYTES # BLD AUTO: 0.02 10*3/MM3 (ref 0–0.05)
IMM GRANULOCYTES NFR BLD AUTO: 0.4 % (ref 0–0.5)
KETONES UR QL STRIP: NEGATIVE
LEUKOCYTE ESTERASE UR QL STRIP.AUTO: NEGATIVE
LYMPHOCYTES # BLD AUTO: 1.87 10*3/MM3 (ref 0.7–3.1)
LYMPHOCYTES NFR BLD AUTO: 32.8 % (ref 19.6–45.3)
MCH RBC QN AUTO: 27.1 PG (ref 26.6–33)
MCHC RBC AUTO-ENTMCNC: 32 G/DL (ref 31.5–35.7)
MCV RBC AUTO: 84.7 FL (ref 79–97)
MONOCYTES # BLD AUTO: 0.42 10*3/MM3 (ref 0.1–0.9)
MONOCYTES NFR BLD AUTO: 7.4 % (ref 5–12)
NEUTROPHILS # BLD AUTO: 3.15 10*3/MM3 (ref 1.7–7)
NEUTROPHILS NFR BLD AUTO: 55.2 % (ref 42.7–76)
NITRITE UR QL STRIP: NEGATIVE
NRBC BLD AUTO-RTO: 0 /100 WBC (ref 0–0.2)
PH UR STRIP.AUTO: 8.5 [PH] (ref 5–8)
PLATELET # BLD AUTO: 227 10*3/MM3 (ref 140–450)
PMV BLD AUTO: 8.8 FL (ref 6–12)
PROT UR QL STRIP: NEGATIVE
RBC # BLD AUTO: 4.24 10*6/MM3 (ref 3.77–5.28)
SP GR UR STRIP: 1.01 (ref 1–1.03)
UROBILINOGEN UR QL STRIP: ABNORMAL
WBC NRBC COR # BLD: 5.7 10*3/MM3 (ref 3.4–10.8)

## 2020-01-21 PROCEDURE — 85025 COMPLETE CBC W/AUTO DIFF WBC: CPT | Performed by: SPECIALIST

## 2020-01-21 PROCEDURE — 81003 URINALYSIS AUTO W/O SCOPE: CPT | Performed by: SPECIALIST

## 2020-01-21 RX ORDER — NICOTINE 21 MG/24HR
1 PATCH, TRANSDERMAL 24 HOURS TRANSDERMAL EVERY 24 HOURS
Status: DISCONTINUED | OUTPATIENT
Start: 2020-01-21 | End: 2020-01-23 | Stop reason: HOSPADM

## 2020-01-21 RX ORDER — BUPROPION HYDROCHLORIDE 300 MG/1
300 TABLET ORAL NIGHTLY
Status: DISCONTINUED | OUTPATIENT
Start: 2020-01-21 | End: 2020-01-21

## 2020-01-21 RX ORDER — LAMOTRIGINE 100 MG/1
100 TABLET ORAL NIGHTLY
Status: DISCONTINUED | OUTPATIENT
Start: 2020-01-21 | End: 2020-01-23 | Stop reason: HOSPADM

## 2020-01-21 RX ORDER — CETIRIZINE HYDROCHLORIDE 10 MG/1
10 TABLET ORAL NIGHTLY
Status: DISCONTINUED | OUTPATIENT
Start: 2020-01-21 | End: 2020-01-23 | Stop reason: HOSPADM

## 2020-01-21 RX ADMIN — TRAZODONE HYDROCHLORIDE 100 MG: 50 TABLET ORAL at 20:28

## 2020-01-21 RX ADMIN — LAMOTRIGINE 100 MG: 100 TABLET ORAL at 20:27

## 2020-01-21 RX ADMIN — CETIRIZINE HYDROCHLORIDE 10 MG: 10 TABLET, FILM COATED ORAL at 20:27

## 2020-01-21 RX ADMIN — BUPROPION HYDROCHLORIDE 450 MG: 300 TABLET, EXTENDED RELEASE ORAL at 20:27

## 2020-01-21 RX ADMIN — PROPRANOLOL HYDROCHLORIDE 20 MG: 20 TABLET ORAL at 09:34

## 2020-01-21 RX ADMIN — PROPRANOLOL HYDROCHLORIDE 20 MG: 20 TABLET ORAL at 20:27

## 2020-01-21 RX ADMIN — NICOTINE 1 PATCH: 21 PATCH, EXTENDED RELEASE TRANSDERMAL at 12:03

## 2020-01-21 NOTE — PLAN OF CARE
Problem: Patient Care Overview  Goal: Discharge Needs Assessment  1/21/2020 1043 by Lynne Pleitez CSW  Outcome: Ongoing (interventions implemented as appropriate)  Flowsheets (Taken 1/21/2020 1040)  Transportation Anticipated: family or friend will provide  Transportation Concerns: car, none  Concerns to be Addressed: coping/stress;decision making;mental health;suicidal;substance/tobacco abuse/use  Patient/Family Anticipated Services at Transition: mental health services  Patient/Family Anticipates Transition to: home with family  1/21/2020 1043 by Lynne Pleitez CSW  Outcome: Ongoing (interventions implemented as appropriate)  Flowsheets  Taken 1/21/2020 1043  Concerns Comments: Pt will return home.  Pt will receive an RISSA consult & family session while inpt. SW will explore outpt providers for continuity of care.  Taken 1/21/2020 1040  Transportation Anticipated: family or friend will provide  Transportation Concerns: car, none  Concerns to be Addressed: coping/stress;decision making;mental health;suicidal;substance/tobacco abuse/use  Patient/Family Anticipated Services at Transition: mental health services  Patient/Family Anticipates Transition to: home with family

## 2020-01-21 NOTE — PLAN OF CARE
Problem: Patient Care Overview  Goal: Plan of Care Review  Outcome: Ongoing (interventions implemented as appropriate)  Flowsheets (Taken 1/21/2020 1526)  Progress: no change  Plan of Care Reviewed With: patient  Patient Agreement with Plan of Care: agrees  Outcome Summary: Patient voiced anxiety 7/10. PRN propnaolol given during a.m. Rated depression 10/10. Denied any SI, HI, pain, or hallucinations. Spent most of day reading in lounge. Cooperative with medications. Attending groups. continuing to monitor.     Problem: Suicidal Behavior (Adult)  Goal: Suicidal Behavior is Absent/Minimized/Managed  Outcome: Ongoing (interventions implemented as appropriate)  Flowsheets (Taken 1/21/2020 1526)  Suicidal Behavior Managed/Minimized Time Frame for Action Step (STG): 3 days  Suicidal Behavior Managed/Minimized Action Step (STG) Outcome: making progress toward outcome     Problem: Mood Impairment (Depressive Signs/Symptoms) (Adult)  Goal: Improved Mood Symptoms (Depressive Signs/Symptoms)  Outcome: Ongoing (interventions implemented as appropriate)  Flowsheets (Taken 1/21/2020 1526)  Improved Mood Symptoms Time Frame for Action Step (STG): 3 days  Improved Mood Symptoms Action Step (STG) Outcome: making progress toward outcome     Problem: Decreased Participation/Engagement (Depressive Signs/Symptoms) (Adult)  Goal: Increased Participation/Engagement (Depressive Signs/Symptoms)  Outcome: Ongoing (interventions implemented as appropriate)  Flowsheets (Taken 1/21/2020 1526)  Increased Participation/Engagement Time Frame for Action Step (STG): 3 days  Increased Participation/Engagement Action Step (STG) Outcome: making progress toward outcome     Problem: Sleep Impairment (Depressive Signs/Symptoms) (Adult)  Goal: Improved Sleep Hygiene (Depressive Signs/Symptoms)  Outcome: Ongoing (interventions implemented as appropriate)  Flowsheets (Taken 1/21/2020 1526)  Improved Sleep Hygiene Time Frame for Action Step (STG): 3  days  Improved Sleep Hygiene Action Step (STG) Outcome: making progress toward outcome     Problem: Weight/Appetite Change (Depressive Signs/Symptoms) (Adult)  Goal: Nutrition/Weight Optimized (Depressive Signs/Symptoms)  Outcome: Ongoing (interventions implemented as appropriate)  Flowsheets (Taken 1/21/2020 1526)  Optimized Nutrition/Weight Time Frame for Action Step (STG): 3 days  Optimized Nutrition/Weight Action Step (STG) Outcome: making progress toward outcome

## 2020-01-21 NOTE — ED NOTES
Pt is going to [facility] 23-1 with security.      Cassi Muro, RN  01/20/20 1950

## 2020-01-21 NOTE — PROGRESS NOTES
"Clinical Pharmacy Services: Medication History    Delmis Casiano is a 29 y.o. female presenting to [facility] for Bipolar 2 disorder, major depressive episode (CMS/[facility]) [F31.81]    She  has a past medical history of Depression.    Allergies as of 01/20/2020    (No Known Allergies)       Medication information was obtained from:patient and pharmacy  Pharmacy and Phone Number: [facility] - [phone]    Prior to Admission Medications       Prescriptions Last Dose Informant Patient Reported? Taking?    buPROPion XL (WELLBUTRIN XL) 300 MG 24 hr tablet  Self Yes Yes    Take 300 mg by mouth Daily.    cetirizine (zyrTEC) 10 MG tablet  Self Yes Yes    Take 10 mg by mouth Daily.    lamoTRIgine (LaMICtal) 100 MG tablet  Self Yes Yes    Take 100 mg by mouth Daily.    propranolol (INDERAL) 10 MG tablet  Self Yes Yes    Take 20 mg by mouth 2 (Two) Times a Day As Needed (Anxiety).    traZODone (DESYREL) 100 MG tablet  Self Yes Yes    Take 100 mg by mouth At Night As Needed for Sleep.                Medication notes:     Patient states she needs help with extreme irritability and inability to process thought when on high doses of antimanics.    Psych medications trialed previously:  Celexa - ineffective therapy  Abilify & Risperdal 0.25 mg - akathisia  Lithium 300mg ER - helped for a couple weeks then stopped working  Prozac - racing thoughts, pacing, trialed for 2 months  Paxil 10mg daily - ineffective therapy  Zoloft - ineffective therapy  Seroquel - was \"like a horse tranquilizer\"  Vraylar 1.5 mg - didn't give it a chance due to concern with akathisia  Buspar 10mg TID    Notes about medications being taken currently:  Lamictal - tried increasing dose to 200mg had significant cognitive block.  Wellbutrin - seems to work    This medication list is complete to the best of my knowledge as of 1/21/2020    Please call if questions.    Carlos Tatum, PharmD Candidate  1/21/2020 9:27 AM        "

## 2020-01-21 NOTE — CONSULTS
Patient Name:  Delmis Casiano  YOB: 1990  MRN:  [id number]  Date of Admission:  1/20/2020  Date of Consult:  1/21/2020  Patient Care Team:  Serena Bob APRN as PCP - General (Family Medicine)    Consults  Date of Admit: 1/20/2020  Date of Consult: 1/21/2020    Medical evaluation contributing to current psychiatric illness.    Subjective     History of Present Illness  Ms. Casiano is a 29 y.o. female with a history of cannabis use, seasonal allergies, tobacco abuse, depression and bipolar disorderadmitted to the Crisis Management Unit for further evaluation regarding depression and suicidal ideation.  We were asked to see and evaluate her medical issues as they may pertain to her current psychiatric illness.  She currently denies any chest pain, shortness of breath, nausea or vomiting.  No recent illnesses.    Past Medical History:   Diagnosis Date   • Depression      History reviewed. No pertinent surgical history.  Family History   Problem Relation Age of Onset   • Depression Mother    • Alcohol abuse Father    • Suicide Attempts Maternal Grandfather      Social History     Tobacco Use   • Smoking status: Current Every Day Smoker     Packs/day: 0.50     Types: Cigarettes   Substance Use Topics   • Alcohol use: Yes     Comment: has recently significantly decreased her intake   • Drug use: No     Medications Prior to Admission   Medication Sig Dispense Refill Last Dose   • buPROPion XL (WELLBUTRIN XL) 300 MG 24 hr tablet Take 300 mg by mouth Daily.      • cetirizine (zyrTEC) 10 MG tablet Take 10 mg by mouth Daily.   4/6/2017 at Unknown time   • lamoTRIgine (LaMICtal) 100 MG tablet Take 100 mg by mouth Daily.      • propranolol (INDERAL) 10 MG tablet Take 20 mg by mouth 2 (Two) Times a Day As Needed (Anxiety).      • traZODone (DESYREL) 100 MG tablet Take 100 mg by mouth At Night As Needed for Sleep.        Allergies:  Patient has no known allergies.    Review of Systems   Constitutional:  Negative for chills and fever.   HENT: Negative for congestion and dental problem.    Eyes: Negative for discharge and itching.   Respiratory: Negative for chest tightness and shortness of breath.    Cardiovascular: Negative for chest pain and palpitations.   Gastrointestinal: Negative for nausea and vomiting.   Endocrine: Negative for cold intolerance and heat intolerance.   Genitourinary: Negative for difficulty urinating and dysuria.   Musculoskeletal: Negative for back pain and gait problem.   Skin: Negative for color change and pallor.   Allergic/Immunologic: Negative for environmental allergies and food allergies.   Neurological: Negative for dizziness, light-headedness and headaches.   Hematological: Negative for adenopathy. Does not bruise/bleed easily.   Psychiatric/Behavioral: Negative for agitation and confusion.       Objective      Vital Signs  Temp:  [97.1 °F (36.2 °C)] 97.1 °F (36.2 °C)  Heart Rate:  [75-85] 79  Resp:  [16] 16  BP: (102-157)/(61-92) 103/73  Body mass index is 28.73 kg/m².    Physical Exam   Constitutional: She is oriented to person, place, and time. She appears well-developed and well-nourished. No distress.   HENT:   Head: Normocephalic and atraumatic.   Eyes: Conjunctivae and EOM are normal.   Neck: Normal range of motion. Neck supple.   Cardiovascular: Normal rate and regular rhythm.   Pulmonary/Chest: Effort normal and breath sounds normal. No respiratory distress.   Abdominal: Soft. Bowel sounds are normal. She exhibits no distension.   Musculoskeletal: Normal range of motion. She exhibits no edema.   Neurological: She is alert and oriented to person, place, and time.   Skin: Skin is warm and dry.   Psychiatric: She has a normal mood and affect. Her behavior is normal.   Nursing note and vitals reviewed.      Results Review:    I reviewed the patient's new clinical results.    Assessment/Plan     Active Hospital Problems    Diagnosis  POA   • **Bipolar 2 disorder, major  depressive episode (CMS/HCC) [F31.81]  Yes   • Marijuana use [F12.90]  Yes   • Tobacco abuse [Z72.0]  Yes   • Seasonal allergies [J30.2]  Yes   • Severe recurrent major depression without psychotic features (CMS/HCC) [F33.2]  Yes      Resolved Hospital Problems   No resolved problems to display.       Assessment & Plan    Assessment:   The patient seems medically stable at this time.  There are no medical issues which need to be addressed while she is under psychiatric care.  She should continue to follow up with her PCP as an outpatient.     We will sign off at this time.       BLAS Smith  [facility]  01/21/20  4:22 PM

## 2020-01-21 NOTE — PLAN OF CARE
Pt arrived on unit accompanied by security in wheel chair. Patients belongings were inspected by access, duffle bagged locked up. Pt was irritable upon arrival, but after assessment pt was calm. Stated she was more anxious than depressed, but did not rate either. Pt was able to contract for safety and denied current thoughts to hurt self or others. Pt came out in milieu, engaged with staff and peers. Cooperative and compliant with medications, will continue to provide feedback and support.    Problem: Patient Care Overview  Goal: Plan of Care Review  Outcome: Ongoing (interventions implemented as appropriate)  Flowsheets (Taken 1/21/2020 0426)  Progress: no change  Plan of Care Reviewed With: patient  Patient Agreement with Plan of Care: agrees  Goal: Individualization and Mutuality  Outcome: Ongoing (interventions implemented as appropriate)  Goal: Discharge Needs Assessment  Outcome: Ongoing (interventions implemented as appropriate)  Goal: Interprofessional Rounds/Family Conf  Outcome: Ongoing (interventions implemented as appropriate)     Problem: Overarching Goals (Adult)  Goal: Adheres to Safety Considerations for Self and Others  Outcome: Ongoing (interventions implemented as appropriate)  Flowsheets (Taken 1/21/2020 0426)  Adheres to Safety Considerations for Self and Others: making progress toward outcome  Goal: Optimized Coping Skills in Response to Life Stressors  Outcome: Ongoing (interventions implemented as appropriate)  Flowsheets (Taken 1/21/2020 0426)  Optimized Coping Skills in Response to Life Stressors: making progress toward outcome  Goal: Develops/Participates in Therapeutic [place] to Support Successful Transition  Outcome: Ongoing (interventions implemented as appropriate)  Flowsheets (Taken 1/21/2020 0426)  Develops/Participates in Therapeutic [place] to Support Successful Transition: making progress toward outcome     Problem: Suicidal Behavior (Adult)  Goal: Suicidal Behavior is  Absent/Minimized/Managed  Outcome: Ongoing (interventions implemented as appropriate)  Flowsheets (Taken 1/21/2020 0426)  Suicidal Behavior Managed/Minimized Action Step (STG) Outcome: making progress toward outcome     Problem: Mood Impairment (Depressive Signs/Symptoms) (Adult)  Goal: Improved Mood Symptoms (Depressive Signs/Symptoms)  Outcome: Ongoing (interventions implemented as appropriate)  Flowsheets (Taken 1/21/2020 0426)  Improved Mood Symptoms Action Step (STG) Outcome: making progress toward outcome     Problem: Decreased Participation/Engagement (Depressive Signs/Symptoms) (Adult)  Goal: Increased Participation/Engagement (Depressive Signs/Symptoms)  Outcome: Ongoing (interventions implemented as appropriate)  Flowsheets (Taken 1/21/2020 0426)  Increased Participation/Engagement Action Step (STG) Outcome: making progress toward outcome     Problem: Sleep Impairment (Depressive Signs/Symptoms) (Adult)  Goal: Improved Sleep Hygiene (Depressive Signs/Symptoms)  Outcome: Ongoing (interventions implemented as appropriate)  Flowsheets (Taken 1/21/2020 0426)  Improved Sleep Hygiene Action Step (STG) Outcome: making progress toward outcome     Problem: Weight/Appetite Change (Depressive Signs/Symptoms) (Adult)  Goal: Nutrition/Weight Optimized (Depressive Signs/Symptoms)  Outcome: Ongoing (interventions implemented as appropriate)  Flowsheets (Taken 1/21/2020 0426)  Optimized Nutrition/Weight Action Step (STG) Outcome: making progress toward outcome

## 2020-01-21 NOTE — PLAN OF CARE
Problem: Patient Care Overview  Goal: Individualization and Mutuality  Outcome: Ongoing (interventions implemented as appropriate)  Flowsheets (Taken 1/21/2020 1005)  Patient Personal Strengths: stable living environment; family/social support     Problem: Patient Care Overview  Goal: Interprofessional Rounds/Family Conf  Outcome: Ongoing (interventions implemented as appropriate)  Flowsheets (Taken 1/21/2020 1005)  Participants: art therapy; ; nursing; social work; psychiatrist; pharmacy; pastoral care  Summary: Treatment team met to discuss pt's plan of care.  Pt will receive a family session & RISSA consult while inpt.  SW will explore outpt providers for continuity of care.  Progress & svcs needed will be assessed ongoing.     Problem: Suicidal Behavior (Adult)  Goal: Suicidal Behavior is Absent/Minimized/Managed  Outcome: Ongoing (interventions implemented as appropriate)  Flowsheets  Taken 1/21/2020 1005 by Lynne Pleitez CSW  Suicidal Behavior Managed/Minimized Time Frame for Action Step (STG): 4 days  Taken 1/21/2020 0426 by Mague Carter RN  Suicidal Behavior Managed/Minimized Action Step (STG) Outcome: making progress toward outcome     Problem: Mood Impairment (Depressive Signs/Symptoms) (Adult)  Goal: Improved Mood Symptoms (Depressive Signs/Symptoms)  Outcome: Ongoing (interventions implemented as appropriate)  Flowsheets  Taken 1/21/2020 1005 by Lynne Pleitez CSW  Improved Mood Symptoms Time Frame for Action Step (STG): 4 days  Taken 1/21/2020 0426 by Mague Carter RN  Improved Mood Symptoms Action Step (STG) Outcome: making progress toward outcome     Problem: Decreased Participation/Engagement (Depressive Signs/Symptoms) (Adult)  Goal: Increased Participation/Engagement (Depressive Signs/Symptoms)  Outcome: Ongoing (interventions implemented as appropriate)  Flowsheets  Taken 1/21/2020 1005 by Lynne Pleitez CSW  Increased Participation/Engagement Time Frame for Action  Step (STG): 4 days  Taken 1/21/2020 0426 by Mague Carter RN  Increased Participation/Engagement Action Step (STG) Outcome: making progress toward outcome     Problem: Sleep Impairment (Depressive Signs/Symptoms) (Adult)  Goal: Improved Sleep Hygiene (Depressive Signs/Symptoms)  Outcome: Ongoing (interventions implemented as appropriate)  Flowsheets (Taken 1/21/2020 1005)  Improved Sleep Hygiene Time Frame for Action Step (STG): 4 days  Improved Sleep Hygiene Action Step (STG) Outcome: making progress toward outcome     Problem: Weight/Appetite Change (Depressive Signs/Symptoms) (Adult)  Goal: Nutrition/Weight Optimized (Depressive Signs/Symptoms)  Outcome: Ongoing (interventions implemented as appropriate)  Flowsheets (Taken 1/21/2020 0426 by Mague Carter RN)  Optimized Nutrition/Weight Action Step (STG) Outcome: making progress toward outcome    Patient/Guardian Signature: __________________________________            Psychiatrist Signature: ______________________________________             Therapist Signature: ________________________________________         Nurse Signature: ___________________________________________          Staff Signature: ____________________________________________            Staff Signature: ____________________________________________          Staff Signature: ____________________________________________          Staff Signature:

## 2020-01-21 NOTE — H&P
"IDENTIFYING INFORMATION: The patient is a 29-year-old white female with a history of bipolar disorder admitted with increasing depression and suicidal ideation.    CHIEF COMPLAINT: None given    INFORMANT: Patient and chart    RELIABILITY: Good    HISTORY OF PRESENT ILLNESS: The patient is a 29-year-old  white female admitted to the hospital with increasing depression and suicidal ideation.  The patient reports stressors including her  having undergone neck surgery and having been off work for some time leaving her in charge of the home and finances.  The patient reports that she had a suicide plan of jumping in front of a train but is also reported multiple other plans at times.  The patient was last admitted to this facility in 2017 for depression and was also abusing alcohol at a time.  The patient currently denies use of alcohol but continues to smoke marijuana.  Patient is reporting ongoing thoughts of suicide when seen today but is able to promise safety in the hospital.  The patient is currently prescribed Lamictal and Wellbutrin XL.  She currently does not have a psychiatric provider but \"has been looking for 1 for a long time\".  She reports that she has not done well on several other medications including Risperdal Vraylar Paxil Prozac lithium Zoloft Abilify and Celexa.  The patient was also unable to tolerated increase in her Lamictal dose secondary to \"cognitive blocking\".  Patient denies recent changes in sleep or appetite.    PAST PSYCHIATRIC HISTORY: As above    PAST MEDICAL HISTORY: Noncontributory    MEDICATIONS:   Current Facility-Administered Medications   Medication Dose Route Frequency Provider Last Rate Last Dose   • acetaminophen (TYLENOL) tablet 650 mg  650 mg Oral Q4H PRN Jameel Valdez III, MD       • aluminum-magnesium hydroxide-simethicone (MAALOX MAX) 400-400-40 MG/5ML suspension 15 mL  15 mL Oral Q6H PRN Jameel Valdez III, MD       • buPROPion XL " (WELLBUTRIN XL) 24 hr tablet 450 mg  450 mg Oral Nightly Jameel Valdez III, MD       • cetirizine (zyrTEC) tablet 10 mg  10 mg Oral Nightly Jameel Valdez III, MD       • lamoTRIgine (LaMICtal) tablet 100 mg  100 mg Oral Nightly Jameel Valdez III, MD       • magnesium hydroxide (MILK OF MAGNESIA) suspension 2400 mg/10mL 10 mL  10 mL Oral Daily PRN Jameel Valdez III, MD       • nicotine (NICODERM CQ) 21 MG/24HR patch 1 patch  1 patch Transdermal Q24H Jameel Valdez III, MD   1 patch at 01/20/20 2155   • propranolol (INDERAL) tablet 20 mg  20 mg Oral Q12H PRN Jameel Valdez III, MD   20 mg at 01/21/20 0934   • traZODone (DESYREL) tablet 100 mg  100 mg Oral Nightly PRN Jameel Valdez III, MD   100 mg at 01/20/20 2156         ALLERGIES: None    FAMILY HISTORY: Positive for bipolar disorder    SOCIAL HISTORY: Patient lives with her  and 2 children.  She reports occasional use of cannabis but is not currently abusing alcohol per her report.    MENTAL STATUS EXAM: Patient is well-developed well-nourished white female appearing her stated age.  She is no apparent physical distress at the time of examination.  She is awake alert and oriented in all spheres.  Her mood is mildly dysphoric her affect constricted.  Speech is well coherent.  There are no deficits memory cognition noted.  Intelligence is judged in the average range based on fund of knowledge, the patient is cooperative interview.  She continues to endorse positive suicidal action but is able to promise safety in the hospital.  She denies oscillation.  She denies any psychotic symptoms.  Her judgment insight appear to be reasonably intact.    ASSETS/LIABILITIES: Motivation for change, devotion to family/ongoing abuse of cannabis    DIAGNOSTIC IMPRESSION: Bipolar disorder depressed phase, cannabis use disorder    PLAN: The patient remains hospitalized for safety  stabilization.  We will look to be conservative in her medication management, given her history of negative responses to previously described medications.  Accordingly, I will increase Wellbutrin XL to 450 mg daily continuing Lamictal at its current dose.  Patient will participate in appropriate [place] milieu activities.  Given her admitted abuse of cannabis we will ask for a CD evaluation and a family therapy session will also be sought.  The patient looks to be a good candidate for participation in the intensive outpatient program.  Estimated length of stay in the hospital is 3 to 5 days.

## 2020-01-22 RX ADMIN — LAMOTRIGINE 100 MG: 100 TABLET ORAL at 20:41

## 2020-01-22 RX ADMIN — BUPROPION HYDROCHLORIDE 450 MG: 300 TABLET, EXTENDED RELEASE ORAL at 20:41

## 2020-01-22 RX ADMIN — PROPRANOLOL HYDROCHLORIDE 20 MG: 20 TABLET ORAL at 08:18

## 2020-01-22 RX ADMIN — TRAZODONE HYDROCHLORIDE 100 MG: 50 TABLET ORAL at 20:41

## 2020-01-22 RX ADMIN — NICOTINE 1 PATCH: 21 PATCH, EXTENDED RELEASE TRANSDERMAL at 15:15

## 2020-01-22 RX ADMIN — CETIRIZINE HYDROCHLORIDE 10 MG: 10 TABLET, FILM COATED ORAL at 20:41

## 2020-01-22 NOTE — PROGRESS NOTES
Continued Stay Note  [facility]     Patient Name: Delmis Casiano  MRN: [id number]  Today's Date: 1/22/2020    Admit Date: 1/20/2020    Discharge Plan     Row Name 01/22/20 5030       Plan    Plan Comments  SW met w/pt to discuss follow-up care regarding pt wanting to attend an IOP.  SW discussed options w/pt & adv that [facility]  Pt did not want to attend IOP at either of those facilities.  She stated that someone had informed her about IOP at [facility].  SW discussed securing a mental health therapist w/pt;' pt stated that she iwould like to see someone and could be available from 9 am to 4 pm.        Discharge Codes    No documentation.             EDDIE Espinoza

## 2020-01-22 NOTE — PROGRESS NOTES
Patient seems brighter today.  She tolerated the increased dose of Wellbutrin without complaint.  She states the need to be out of the hospital by tomorrow citing childcare necessities.  She is reporting reduction in suicidal thinking.

## 2020-01-22 NOTE — CONSULTS
"Reviewed chart and interviewed pt. Pt states she smokes MJ daily. \"I smoke a bowl when I get up and when I go to bed at night every day\" \"I just do it because I like it\"   Educated pt on negative effects on the brain of MJ and that MJ is as addictive as any other psychoactive substance. Educated pt that  psychotropic medications are less effective when using substances that are psychoactive. Educated pt on RISSA. Discussed [facility], [facility], [facility]  and [facility] programs and others. Discussed sober living facilities. Gave pt hand out of all area RISSA resources and meeting directories for [facility], [facility], and 12 step meetings. \"I do not like AA\" \"I like the sound of [facility]\" Encouraged pt to go to RISSA treatment.  "

## 2020-01-22 NOTE — PLAN OF CARE
Problem: Patient Care Overview  Goal: Plan of Care Review  Outcome: Ongoing (interventions implemented as appropriate)  Flowsheets (Taken 1/22/2020 1510)  Progress: improving  Plan of Care Reviewed With: patient  Patient Agreement with Plan of Care: agrees  Outcome Summary: Pt stated she awakened this am with agitation and high anxiety. Pt given Propanolol per request. Pt has attended all programming. Anticipating discharge tomorrow. Denies SI.     Problem: Patient Care Overview  Goal: Individualization and Mutuality  Outcome: Ongoing (interventions implemented as appropriate)     Problem: Patient Care Overview  Goal: Discharge Needs Assessment  Outcome: Ongoing (interventions implemented as appropriate)     Problem: Patient Care Overview  Goal: Interprofessional Rounds/Family Conf  Outcome: Ongoing (interventions implemented as appropriate)     Problem: Overarching Goals (Adult)  Goal: Adheres to Safety Considerations for Self and Others  Outcome: Ongoing (interventions implemented as appropriate)  Flowsheets (Taken 1/22/2020 1510)  Adheres to Safety Considerations for Self and Others: making progress toward outcome     Problem: Overarching Goals (Adult)  Goal: Optimized Coping Skills in Response to Life Stressors  Outcome: Ongoing (interventions implemented as appropriate)  Flowsheets (Taken 1/22/2020 1510)  Optimized Coping Skills in Response to Life Stressors: making progress toward outcome     Problem: Overarching Goals (Adult)  Goal: Develops/Participates in Therapeutic [place] to Support Successful Transition  Outcome: Ongoing (interventions implemented as appropriate)  Flowsheets (Taken 1/22/2020 1510)  Develops/Participates in Therapeutic [place] to Support Successful Transition: making progress toward outcome     Problem: Suicidal Behavior (Adult)  Goal: Suicidal Behavior is Absent/Minimized/Managed  Outcome: Ongoing (interventions implemented as appropriate)  Flowsheets (Taken 1/22/2020 1510)  Action  Step/Short Term Goal (STG) Established: 1/20/2020  Suicidal Behavior Managed/Minimized Time Frame for Action Step (STG): 2 days  Suicidal Behavior Managed/Minimized Action Step (STG) Outcome: making progress toward outcome     Problem: Mood Impairment (Depressive Signs/Symptoms) (Adult)  Goal: Improved Mood Symptoms (Depressive Signs/Symptoms)  Outcome: Ongoing (interventions implemented as appropriate)  Flowsheets (Taken 1/22/2020 1510)  Improved Mood Symptoms Action Step/Short Term Goal (STG) Established: 1/20/2020  Improved Mood Symptoms Time Frame for Action Step (STG): 2 days  Improved Mood Symptoms Action Step (STG) Outcome: making progress toward outcome     Problem: Decreased Participation/Engagement (Depressive Signs/Symptoms) (Adult)  Goal: Increased Participation/Engagement (Depressive Signs/Symptoms)  Outcome: Ongoing (interventions implemented as appropriate)  Flowsheets (Taken 1/22/2020 1510)  Increased Participation/Engagement Action Step/Short Term Goal (STG) Established: 1/20/2020  Increased Participation/Engagement Action Step (STG) Outcome: making progress toward outcome     Problem: Sleep Impairment (Depressive Signs/Symptoms) (Adult)  Goal: Improved Sleep Hygiene (Depressive Signs/Symptoms)  Outcome: Ongoing (interventions implemented as appropriate)  Flowsheets (Taken 1/22/2020 1510)  Improved Sleep Hygiene Action Step/Short Term Goal (STG) Established: 1/20/2020  Improved Sleep Hygiene Time Frame for Action Step (STG): 2 days  Improved Sleep Hygiene Action Step (STG) Outcome: making progress toward outcome     Problem: Weight/Appetite Change (Depressive Signs/Symptoms) (Adult)  Goal: Nutrition/Weight Optimized (Depressive Signs/Symptoms)  Outcome: Ongoing (interventions implemented as appropriate)  Flowsheets (Taken 1/22/2020 1510)  Optimized Nutrition/Weight Action Step/Short Term Goal (STG) Established: 1/20/2020  Optimized Nutrition/Weight Time Frame for Action Step (STG): 2 days  Optimized  Nutrition/Weight Action Step (STG) Outcome: making progress toward outcome

## 2020-01-22 NOTE — CONSULTS
"Responded to Verbal Consult    Focus:   \"Agitation\" pt was experiencing this morning    Primary Stressors:  Pt stated primary sources of stress come from being the \"primary caretaker of [their]  and two kids.\"   : \"Just had spine surgery.\"   Kids: \"Fight all the time.\"      Pt's spouse comes from a \"conservative Taoism family\" and pt stated she believes that \"even the impeachment trial is affecting [her]\" and \"getting away from his family is helping\"    Lynne:  Pt stated \"I am not opposed to the metaphysical, but prayer and everything like that just is all fake to me.\"      Hope:  Pt hopes for the \"right adjustment to meds to deal with the stress.\"    Pt expressed desire to \"start a morning routine\" in order to \"take time for [herself]\"   Pt stated: \"I'm open to any suggestions.\"    Recommendations:   Yoga: Pt stated \"My mind works too fast to meditate and do yoga.\"    Podcast: Pt received this recommendation positively, and said she is \"very interested in learning new things.\"       Pt appreciative of visit.  Follow-up as able     "

## 2020-01-22 NOTE — PLAN OF CARE
Pt present out in milieu, engaged with staff and peers. Cooperative and compliant with medications. Pt requested PRN anxiety medication. Rated anxiety and depression both 8. Pt denied current thoughts to hurt self or others. Pt would laugh and smile during conversation, remains pleasant. Will continue to provide feedback and support.    Problem: Patient Care Overview  Goal: Plan of Care Review  Outcome: Ongoing (interventions implemented as appropriate)  Flowsheets (Taken 1/22/2020 0311)  Progress: no change  Plan of Care Reviewed With: patient  Patient Agreement with Plan of Care: agrees  Goal: Individualization and Mutuality  Outcome: Ongoing (interventions implemented as appropriate)  Goal: Discharge Needs Assessment  Outcome: Ongoing (interventions implemented as appropriate)  Goal: Interprofessional Rounds/Family Conf  Outcome: Ongoing (interventions implemented as appropriate)     Problem: Overarching Goals (Adult)  Goal: Adheres to Safety Considerations for Self and Others  Outcome: Ongoing (interventions implemented as appropriate)  Flowsheets (Taken 1/22/2020 0311)  Adheres to Safety Considerations for Self and Others: making progress toward outcome  Goal: Optimized Coping Skills in Response to Life Stressors  Outcome: Ongoing (interventions implemented as appropriate)  Flowsheets (Taken 1/22/2020 0311)  Optimized Coping Skills in Response to Life Stressors: making progress toward outcome  Goal: Develops/Participates in Therapeutic [place] to Support Successful Transition  Outcome: Ongoing (interventions implemented as appropriate)  Flowsheets (Taken 1/22/2020 0311)  Develops/Participates in Therapeutic [place] to Support Successful Transition: making progress toward outcome     Problem: Suicidal Behavior (Adult)  Goal: Suicidal Behavior is Absent/Minimized/Managed  Outcome: Ongoing (interventions implemented as appropriate)  Flowsheets (Taken 1/22/2020 0311)  Suicidal Behavior Managed/Minimized Action  Step (STG) Outcome: making progress toward outcome     Problem: Mood Impairment (Depressive Signs/Symptoms) (Adult)  Goal: Improved Mood Symptoms (Depressive Signs/Symptoms)  Outcome: Ongoing (interventions implemented as appropriate)  Flowsheets (Taken 1/22/2020 0311)  Improved Mood Symptoms Action Step (STG) Outcome: making progress toward outcome     Problem: Decreased Participation/Engagement (Depressive Signs/Symptoms) (Adult)  Goal: Increased Participation/Engagement (Depressive Signs/Symptoms)  Outcome: Ongoing (interventions implemented as appropriate)  Flowsheets (Taken 1/22/2020 0311)  Increased Participation/Engagement Action Step (STG) Outcome: making progress toward outcome     Problem: Sleep Impairment (Depressive Signs/Symptoms) (Adult)  Goal: Improved Sleep Hygiene (Depressive Signs/Symptoms)  Outcome: Ongoing (interventions implemented as appropriate)  Flowsheets (Taken 1/22/2020 0311)  Improved Sleep Hygiene Action Step (STG) Outcome: making progress toward outcome     Problem: Weight/Appetite Change (Depressive Signs/Symptoms) (Adult)  Goal: Nutrition/Weight Optimized (Depressive Signs/Symptoms)  Outcome: Ongoing (interventions implemented as appropriate)  Flowsheets (Taken 1/22/2020 0311)  Optimized Nutrition/Weight Action Step (STG) Outcome: making progress toward outcome

## 2020-01-23 VITALS
HEART RATE: 75 BPM | RESPIRATION RATE: 16 BRPM | OXYGEN SATURATION: 100 % | WEIGHT: 162.2 LBS | SYSTOLIC BLOOD PRESSURE: 104 MMHG | TEMPERATURE: 98 F | BODY MASS INDEX: 28.73 KG/M2 | DIASTOLIC BLOOD PRESSURE: 62 MMHG

## 2020-01-23 RX ORDER — HYDROXYZINE PAMOATE 25 MG/1
25 CAPSULE ORAL 3 TIMES DAILY PRN
Qty: 75 CAPSULE | Refills: 1 | Status: SHIPPED | OUTPATIENT
Start: 2020-01-23

## 2020-01-23 RX ORDER — BUPROPION HYDROCHLORIDE 450 MG/1
450 TABLET, FILM COATED, EXTENDED RELEASE ORAL NIGHTLY
Qty: 30 TABLET | Refills: 1 | Status: SHIPPED | OUTPATIENT
Start: 2020-01-23

## 2020-01-23 RX ORDER — NICOTINE 21 MG/24HR
1 PATCH, TRANSDERMAL 24 HOURS TRANSDERMAL EVERY 24 HOURS
Qty: 7 PATCH | Refills: 0 | Status: SHIPPED | OUTPATIENT
Start: 2020-01-23

## 2020-01-23 RX ORDER — HYDROXYZINE PAMOATE 25 MG/1
25 CAPSULE ORAL 3 TIMES DAILY PRN
Status: DISCONTINUED | OUTPATIENT
Start: 2020-01-23 | End: 2020-01-23 | Stop reason: HOSPADM

## 2020-01-23 RX ORDER — BUPROPION HYDROCHLORIDE 450 MG/1
450 TABLET, FILM COATED, EXTENDED RELEASE ORAL NIGHTLY
Qty: 30 TABLET | Refills: 1 | Status: SHIPPED | OUTPATIENT
Start: 2020-01-23 | End: 2020-01-23

## 2020-01-23 RX ORDER — LAMOTRIGINE 100 MG/1
100 TABLET ORAL DAILY
Qty: 30 TABLET | Refills: 1 | Status: SHIPPED | OUTPATIENT
Start: 2020-01-23

## 2020-01-23 RX ORDER — TRAZODONE HYDROCHLORIDE 100 MG/1
100 TABLET ORAL NIGHTLY PRN
Qty: 30 TABLET | Refills: 1 | Status: SHIPPED | OUTPATIENT
Start: 2020-01-23

## 2020-01-23 NOTE — PLAN OF CARE
"Pt out in conference room reading this shift. Attended nightly community. Cooperative and compliant with medications. Pt was bright, and happy when talking about discharging tomorrow. Pt denied thoughts to hurt self or others, denied current anxiety or depression, stating \"I'm good right now.\" Will continue to provide feedback and support.    Problem: Patient Care Overview  Goal: Plan of Care Review  Outcome: Ongoing (interventions implemented as appropriate)  Flowsheets (Taken 1/23/2020 0319)  Progress: improving  Plan of Care Reviewed With: patient  Patient Agreement with Plan of Care: agrees  Goal: Individualization and Mutuality  Outcome: Ongoing (interventions implemented as appropriate)  Goal: Discharge Needs Assessment  Outcome: Ongoing (interventions implemented as appropriate)  Goal: Interprofessional Rounds/Family Conf  Outcome: Ongoing (interventions implemented as appropriate)     Problem: Overarching Goals (Adult)  Goal: Adheres to Safety Considerations for Self and Others  Outcome: Ongoing (interventions implemented as appropriate)  Flowsheets (Taken 1/23/2020 0319)  Adheres to Safety Considerations for Self and Others: making progress toward outcome  Goal: Optimized Coping Skills in Response to Life Stressors  Outcome: Ongoing (interventions implemented as appropriate)  Flowsheets (Taken 1/23/2020 0319)  Optimized Coping Skills in Response to Life Stressors: making progress toward outcome  Goal: Develops/Participates in Therapeutic [place] to Support Successful Transition  Outcome: Ongoing (interventions implemented as appropriate)  Flowsheets (Taken 1/23/2020 0319)  Develops/Participates in Therapeutic [place] to Support Successful Transition: making progress toward outcome     Problem: Suicidal Behavior (Adult)  Goal: Suicidal Behavior is Absent/Minimized/Managed  Outcome: Ongoing (interventions implemented as appropriate)  Flowsheets (Taken 1/23/2020 0319)  Suicidal Behavior Managed/Minimized " Action Step (STG) Outcome: making progress toward outcome     Problem: Mood Impairment (Depressive Signs/Symptoms) (Adult)  Goal: Improved Mood Symptoms (Depressive Signs/Symptoms)  Outcome: Ongoing (interventions implemented as appropriate)  Flowsheets (Taken 1/23/2020 0319)  Improved Mood Symptoms Action Step (STG) Outcome: making progress toward outcome     Problem: Decreased Participation/Engagement (Depressive Signs/Symptoms) (Adult)  Goal: Increased Participation/Engagement (Depressive Signs/Symptoms)  Outcome: Ongoing (interventions implemented as appropriate)  Flowsheets (Taken 1/23/2020 0319)  Increased Participation/Engagement Action Step (STG) Outcome: making progress toward outcome     Problem: Sleep Impairment (Depressive Signs/Symptoms) (Adult)  Goal: Improved Sleep Hygiene (Depressive Signs/Symptoms)  Outcome: Ongoing (interventions implemented as appropriate)  Flowsheets (Taken 1/23/2020 0319)  Improved Sleep Hygiene Action Step (STG) Outcome: making progress toward outcome     Problem: Weight/Appetite Change (Depressive Signs/Symptoms) (Adult)  Goal: Nutrition/Weight Optimized (Depressive Signs/Symptoms)  Outcome: Ongoing (interventions implemented as appropriate)  Flowsheets (Taken 1/23/2020 0319)  Optimized Nutrition/Weight Action Step (STG) Outcome: making progress toward outcome

## 2020-01-23 NOTE — PROGRESS NOTES
Continued Stay Note  [facility]     Patient Name: Delmis Casiano  MRN: [id number]  Today's Date: 1/23/2020    Admit Date: 1/20/2020    Discharge Plan     Row Name 01/23/20 1232       Plan    Final Discharge Disposition Code  01 - home or self-care    Final Note  Pt was discharged per Dr. Valdez's orders.  SW met w/pt to discuss follow-up care.  Pt declined appts related to substance abuse and IOP.  Pt agreed to see a mental health therapist & has an appt scheduled at [facility].  Pt will continue w/established provider for med mgmt at [facility].  Pt was transported home by a family member.        Discharge Codes    No documentation.       Expected Discharge Date and Time     Expected Discharge Date Expected Discharge Time    Jan 23, 2020 12:19 PM            EDDIE Espinoza

## 2020-01-23 NOTE — DISCHARGE SUMMARY
DATES OF ADMISSION: 1/20/2020-1/23/2020    REASON FOR ADMISSION: The patient is a 29-year-old white female admitted with increasing depression and suicidal ideation.    LABS: UDS positive for cannabis    HOSPITAL COURSE: The patient was admitted to the crisis management unit and placed on suicide precautions, low risk.  She was continued on previously prescribed lamotrigine and Wellbutrin XL was increased from 300 mg daily to 450 mg daily.  No other medication changes were considered given the patient's history of adverse response to multiple other psychotropic medications used in a bipolar spectrum disorder.  The patient did complain of some lightheadedness with PRN propranolol which have been provided for anxiety.  Accordingly, this medication was discontinued and the patient begun on conservatively dosed Vistaril 25mg 3 times daily as needed anxiety.  The patient declined an offer of chemical dependence treatment in spite of her heavy cannabis use.  By 1/23/2020, the patient was in better spirits and requested discharge.  It was ordered.  At that time, the patient denied suicidal ideation, citing her devotion to her  and children's reasons to remain alive.    FINAL DIAGNOSIS: Bipolar disorder most recent episode depressed, cannabis use disorder    DISPOSITION ON DISCHARGE: A full listing of the patient's medications is provided below.  Follow-up will take place with community mental health resources    PROGNOSIS: Fair       Discharge Medications      New Medications      Instructions Start Date   hydrOXYzine pamoate 25 MG capsule  Commonly known as:  VISTARIL   25 mg, Oral, 3 Times Daily PRN      nicotine 21 MG/24HR patch  Commonly known as:  NICODERM CQ   1 patch, Transdermal, Every 24 Hours         Changes to Medications      Instructions Start Date   buPROPion  MG 24 hr tablet  Commonly known as:  FORFIVO XL  What changed:    · medication strength  · how much to take  · when to take this   450 mg,  Oral, Nightly         Continue These Medications      Instructions Start Date   cetirizine 10 MG tablet  Commonly known as:  zyrTEC   10 mg, Oral, Daily      lamoTRIgine 100 MG tablet  Commonly known as:  LaMICtal   100 mg, Oral, Daily      traZODone 100 MG tablet  Commonly known as:  DESYREL   100 mg, Oral, Nightly PRN         Stop These Medications    propranolol 10 MG tablet  Commonly known as:  INDERAL

## 2020-01-27 ENCOUNTER — TELEPHONE (OUTPATIENT)
Dept: PSYCHIATRY | Facility: HOSPITAL | Age: 30
End: 2020-01-27

## 2020-01-27 NOTE — TELEPHONE ENCOUNTER
"Patient reports she is doing \"pretty well.\"  She states she has all her medications and understands her discharge instructions.  No further assistance requested at this time.  "

## 2025-01-25 ENCOUNTER — APPOINTMENT (OUTPATIENT)
Dept: CT IMAGING | Facility: HOSPITAL | Age: 35
End: 2025-01-25
Payer: COMMERCIAL

## 2025-01-25 ENCOUNTER — HOSPITAL ENCOUNTER (EMERGENCY)
Facility: HOSPITAL | Age: 35
Discharge: HOME | End: 2025-01-25
Attending: EMERGENCY MEDICINE
Payer: COMMERCIAL

## 2025-01-25 VITALS
SYSTOLIC BLOOD PRESSURE: 101 MMHG | WEIGHT: 160 LBS | OXYGEN SATURATION: 100 % | RESPIRATION RATE: 16 BRPM | HEART RATE: 66 BPM | HEIGHT: 62 IN | BODY MASS INDEX: 29.44 KG/M2 | DIASTOLIC BLOOD PRESSURE: 70 MMHG | TEMPERATURE: 97.7 F

## 2025-01-25 DIAGNOSIS — M54.50 ACUTE MIDLINE LOW BACK PAIN WITHOUT SCIATICA: Primary | ICD-10-CM

## 2025-01-25 DIAGNOSIS — M53.3 SACRAL PAIN: ICD-10-CM
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
## 2025-01-25 PROCEDURE — 25010000002 KETOROLAC TROMETHAMINE PER 15 MG: Performed by: PHYSICIAN ASSISTANT

## 2025-01-25 PROCEDURE — 72131 CT LUMBAR SPINE W/O DYE: CPT

## 2025-01-25 PROCEDURE — 99284 EMERGENCY DEPT VISIT MOD MDM: CPT

## 2025-01-25 PROCEDURE — 72192 CT PELVIS W/O DYE: CPT

## 2025-01-25 PROCEDURE — 96374 THER/PROPH/DIAG INJ IV PUSH: CPT

## 2025-01-25 RX ORDER — METHOCARBAMOL 750 MG/1
750 TABLET, FILM COATED ORAL ONCE
Status: COMPLETED | OUTPATIENT
Start: 2025-01-25 | End: 2025-01-25

## 2025-01-25 RX ORDER — KETOROLAC TROMETHAMINE 30 MG/ML
30 INJECTION, SOLUTION INTRAMUSCULAR; INTRAVENOUS ONCE
Status: COMPLETED | OUTPATIENT
Start: 2025-01-25 | End: 2025-01-25

## 2025-01-25 RX ORDER — DICLOFENAC SODIUM 75 MG/1
75 TABLET, DELAYED RELEASE ORAL 2 TIMES DAILY
Qty: 14 TABLET | Refills: 0 | Status: SHIPPED | OUTPATIENT
Start: 2025-01-25 | End: 2025-02-01

## 2025-01-25 RX ORDER — HYDROCODONE BITARTRATE AND ACETAMINOPHEN 5; 325 MG/1; MG/1
1 TABLET ORAL EVERY 6 HOURS PRN
Qty: 12 TABLET | Refills: 0 | Status: SHIPPED | OUTPATIENT
Start: 2025-01-25

## 2025-01-25 RX ORDER — KETOROLAC TROMETHAMINE 30 MG/ML
30 INJECTION, SOLUTION INTRAMUSCULAR; INTRAVENOUS ONCE
Status: DISCONTINUED | OUTPATIENT
Start: 2025-01-25 | End: 2025-01-25

## 2025-01-25 RX ORDER — HYDROCODONE BITARTRATE AND ACETAMINOPHEN 5; 325 MG/1; MG/1
1 TABLET ORAL ONCE
Status: COMPLETED | OUTPATIENT
Start: 2025-01-25 | End: 2025-01-25

## 2025-01-25 RX ADMIN — METHOCARBAMOL TABLETS 750 MG: 750 TABLET, COATED ORAL at 06:54

## 2025-01-25 RX ADMIN — HYDROCODONE BITARTRATE AND ACETAMINOPHEN 1 TABLET: 5; 325 TABLET ORAL at 05:07

## 2025-01-25 RX ADMIN — KETOROLAC TROMETHAMINE 30 MG: 30 INJECTION, SOLUTION INTRAMUSCULAR at 06:59

## 2025-01-25 NOTE — ED PROVIDER NOTES
EMERGENCY DEPARTMENT ENCOUNTER  Room Number:  04/04  PCP: Sherine Valdivia APRN  Independent Historians: Patient      HPI:  Chief Complaint: had concerns including Back Pain and Fall.     A complete HPI/ROS/PMH/PSH/SH/FH are unobtainable due to: None    Chronic or social conditions impacting patient care (Social Determinants of Health): None      Context: Delmis Casiano is a 34 y.o. female with a medical history of depression who presents to the ED c/o acute back pain. Pt states she fell down 3 concrete steps outside on Thursday morning and has had lower thoracic, lumbar, and sacral back pain since the fall. Pt reports a tingling sensation on her lateral left thigh. Pt took one of her 's Flexeril at home yesterday and reports no improvement in symptoms. Pt denies LOC, neck pain, N/V/D, incontinence, or any other systemic symptoms at this time.       Review of prior external notes (non-ED) -and- Review of prior external test results outside of this encounter:  Patient seen in office by PCP on 6/7/2024 for annual physical exam and bipolar disorder.  Reviewed assessment and plan.  Check screening labs and have patient follow-up in 1 year.  Reviewed labs collected on 7/29/2024.  CBC with hemoglobin 13.3, CMP with creatinine 0.6.    Prescription drug monitoring program review:     N/A    PAST MEDICAL HISTORY  Active Ambulatory Problems     Diagnosis Date Noted    Severe recurrent major depression without psychotic features 04/07/2017    Bipolar 2 disorder, major depressive episode 01/20/2020    Marijuana use 01/21/2020    Tobacco abuse 01/21/2020    Seasonal allergies 01/21/2020     Resolved Ambulatory Problems     Diagnosis Date Noted    No Resolved Ambulatory Problems     Past Medical History:   Diagnosis Date    Depression          PAST SURGICAL HISTORY  History reviewed. No pertinent surgical history.      FAMILY HISTORY  Family History   Problem Relation Age of Onset    Depression Mother      Alcohol abuse Father     Suicide Attempts Maternal Grandfather          SOCIAL HISTORY  Social History     Socioeconomic History    Marital status:    Tobacco Use    Smoking status: Every Day     Current packs/day: 0.50     Types: Cigarettes   Substance and Sexual Activity    Alcohol use: Yes     Comment: has recently significantly decreased her intake    Drug use: No    Sexual activity: Yes     Partners: Male         ALLERGIES  Patient has no known allergies.      REVIEW OF SYSTEMS  Included in HPI  All systems reviewed and negative except for those discussed in HPI.      PHYSICAL EXAM    I have reviewed the triage vital signs and nursing notes.    ED Triage Vitals   Temp Heart Rate Resp BP SpO2   01/25/25 0413 01/25/25 0413 01/25/25 0413 01/25/25 0420 01/25/25 0413   97.7 °F (36.5 °C) 100 18 100/71 96 %      Temp src Heart Rate Source Patient Position BP Location FiO2 (%)   01/25/25 0413 01/25/25 0413 -- -- --   Tympanic Monitor          Physical Exam  Constitutional:       General: She is not in acute distress.     Appearance: She is well-developed.   HENT:      Head: Normocephalic and atraumatic.   Eyes:      Extraocular Movements: Extraocular movements intact.   Cardiovascular:      Rate and Rhythm: Normal rate and regular rhythm.      Heart sounds: Normal heart sounds.   Pulmonary:      Effort: Pulmonary effort is normal.      Breath sounds: Normal breath sounds.   Abdominal:      General: Abdomen is flat. There is no distension.      Palpations: Abdomen is soft.   Musculoskeletal:      Cervical back: Normal. No tenderness.      Thoracic back: Tenderness present.      Lumbar back: Tenderness present.      Comments: Tenderness upon palpation to lower thoracic, lumbar, and sacral regions.   Skin:     General: Skin is warm.   Neurological:      General: No focal deficit present.      Mental Status: She is alert and oriented to person, place, and time.   Psychiatric:         Mood and Affect: Mood normal.            RADIOLOGY  CT Pelvis Without Contrast    Result Date: 1/25/2025  Patient: COLE LOMAX  Time Out: 06:25 Exam(s): CT PELVIS Without Contrast EXAM:   CT Pelvis Without Intravenous Contrast CLINICAL HISTORY:    Reason for exam: fall with lumbosacral pain. TECHNIQUE:   Axial computed tomography images of the pelvis without intravenous contrast.  CTDI is 11.33 mGy and DLP is 318 mGy-cm.  This CT exam was performed according to the principle of ALARA (As Low As Reasonably Achievable) by using one or more of the following dose reduction techniques: automated exposure control, adjustment of the mA and or kV according to patient size, and or use of iterative reconstruction technique. COMPARISON:   No relevant prior studies available. FINDINGS:   Bones joints:  No acute fracture.   Soft tissues:  Unremarkable.   Bladder:  Unremarkable.   Reproductive:  Hysterectomy. IMPRESSION:     1.  No acute fracture. 2.  Consider MRI if there is further concern. 3.  See additional findings above.     Electronically signed by Lewis Chew MD on 01-25-25 at 0625    CT Lumbar Spine Without Contrast    Result Date: 1/25/2025  Patient: COLE LOMAX  Time Out: 06:21 Exam(s): CT L SPINE EXAM:   CT Lumbar Spine Without Intravenous Contrast CLINICAL HISTORY:    Reason for exam: fall with lumbosacral pain. TECHNIQUE:   Axial computed tomography images of the lumbar spine without intravenous contrast with coronal and sagittal reformats.  CTDI is 18.11 mGy and DLP is 591.1 mGy-cm.  This CT exam was performed according to the principle of ALARA (As Low As Reasonably Achievable) by using one or more of the following dose reduction techniques: automated exposure control, adjustment of the mA and or kV according to patient size, and or use of iterative reconstruction technique. COMPARISON:   No relevant prior studies available. FINDINGS:   Vertebrae:  Lumbar vertebrae intact.   Discs spinal canal neural foramina:  L4-L5 mild  posterior disc bulge without canal or foraminal stenosis. IMPRESSION:     1.  No acute lumbar spine abnormality. 2.  Consider MRI if there is further concern.     Electronically signed by Lewis Chew MD on 01-25-25 at 0621       MEDICATIONS GIVEN IN ER  Medications   HYDROcodone-acetaminophen (NORCO) 5-325 MG per tablet 1 tablet (1 tablet Oral Given 1/25/25 0507)   methocarbamol (ROBAXIN) tablet 750 mg (750 mg Oral Given 1/25/25 0654)   ketorolac (TORADOL) injection 30 mg (30 mg Intravenous Given 1/25/25 0659)         ORDERS PLACED DURING THIS VISIT:  Orders Placed This Encounter   Procedures    CT Lumbar Spine Without Contrast    CT Pelvis Without Contrast         OUTPATIENT MEDICATION MANAGEMENT:  No current Epic-ordered facility-administered medications on file.     Current Outpatient Medications Ordered in Epic   Medication Sig Dispense Refill    cetirizine (zyrTEC) 10 MG tablet Take 1 tablet by mouth Daily. Indications: [place]fever      buPROPion XL (FORFIVO XL) 450 MG 24 hr tablet Take 1 tablet by mouth Every Night. Indications: bmd 30 tablet 1    buPROPion XL (WELLBUTRIN XL) 150 MG 24 hr tablet Take 3 tablets by mouth every night. 90 tablet 1    diclofenac (VOLTAREN) 75 MG EC tablet Take 1 tablet by mouth 2 (Two) Times a Day for 7 days. 14 tablet 0    HYDROcodone-acetaminophen (NORCO) 5-325 MG per tablet Take 1 tablet by mouth Every 6 (Six) Hours As Needed for Severe Pain. 12 tablet 0    hydrOXYzine pamoate (VISTARIL) 25 MG capsule Take 1 capsule by mouth 3 (Three) Times a Day As Needed for Anxiety. 75 capsule 1    lamoTRIgine (LaMICtal) 100 MG tablet Take 1 tablet by mouth Daily. 30 tablet 1    nicotine (NICODERM CQ) 21 MG/24HR patch Place 1 patch on the skin as directed by provider Daily. Indications: Nicotine Addiction 7 patch 0    traZODone (DESYREL) 100 MG tablet Take 1 tablet by mouth At Night As Needed for Sleep 30 tablet 1         PROGRESS, DATA ANALYSIS, CONSULTS, AND MEDICAL DECISION MAKING  All  labs have been independently interpreted by me.  All radiology studies have been reviewed by me. All EKG's have been independently viewed and interpreted by me.  Discussion below represents my analysis of pertinent findings related to patient's condition, differential diagnosis, treatment plan and final disposition.    Differential diagnosis includes but is not limited to sacral fracture, lumbosacral radiculopathy, soft tissue injury.        ED Course as of 01/25/25 2308   Sat Jan 25, 2025   0532 Reassessed patient.  Pain is somewhat improved after Norco.  She is resting comfortably on stretcher.  Awaiting results of CT scans [MP]   0630 After rolling over in stretcher, patient reports she has had no pain relief.  Has been taking 200 mg ibuprofen and 's old Flexeril prescription at home.  Will trial Toradol and dose of Robaxin here.  Will plan to reassess patient [MP]   0801 Pt states no relief with toradol and robaxin. Offered admission for further symptom management but she declines and would like to be discharged home. She feels the Norco was most effective. Discussed follow up and ER return precautions. [DC]      ED Course User Index  [DC] Helene Greenberg PA  [MP] Oralia Mercado PA-C             AS OF 23:08 EST VITALS:    BP - 101/70  HR - 66  TEMP - 97.7 °F (36.5 °C) (Tympanic)  O2 SATS - 100%    COMPLEXITY OF CARE  Admission was considered but after careful review of the patient's presentation, physical examination, diagnostic results, and response to treatment the patient may be safely discharged with outpatient follow-up.      DIAGNOSIS  Final diagnoses:   Acute midline low back pain without sciatica   Sacral pain   Fall down stairs, initial encounter         DISPOSITION  ED Disposition       ED Disposition   Discharge    Condition   Stable    Comment   --                Please note that portions of this document were completed with a voice recognition program.    Note Disclaimer: At [facility], we believe that sharing information builds trust and better relationships. You are receiving this note because you recently visited [facility]. It is possible you will see health information before a provider has talked with you about it. This kind of information can be easy to misunderstand. To help you fully understand what it means for your health, we urge you to discuss this note with your provider.     Oralia Mercado PA-C  01/25/25 7296

## 2025-01-25 NOTE — ED PROVIDER NOTES
MD ATTESTATION NOTE    SHARED VISIT: This visit was performed by BOTH a physician and an APC. The substantive portion of the medical decision making was performed by this attesting physician who made or approved the management plan and takes responsibility for patient management. All studies documented in the APC note (if performed) were independently interpreted by me.    The TOVA and I have discussed this patient's history, physical exam, MDM, and treatment plan.  I have reviewed the documentation and personally had a face to face interaction with the patient. The attached note describes my personal findings.      Delmis Casiano is a 34 y.o. female who presents to the ED c/o acute pain to her sacrum after falling down 3 concrete steps yesterday.  Denies any other injury.  No blow to the head no loss of consciousness.    On exam:  GENERAL: not distressed  HENT: nares patent  EYES: no scleral icterus  CV: regular rhythm, regular rate  RESPIRATORY: normal effort  ABDOMEN: soft  MUSCULOSKELETAL: no deformity  NEURO: alert, moves all extremities, follows commands  SKIN: warm, dry    Labs  No results found for this or any previous visit (from the past 24 hours).    Radiology  No Radiology Exams Resulted Within Past 24 Hours    Medications given in the ED:  Medications   HYDROcodone-acetaminophen (NORCO) 5-325 MG per tablet 1 tablet (1 tablet Oral Given 1/25/25 0507)   methocarbamol (ROBAXIN) tablet 750 mg (750 mg Oral Given 1/25/25 0654)   ketorolac (TORADOL) injection 30 mg (30 mg Intravenous Given 1/25/25 0659)       Orders placed during this visit:  Orders Placed This Encounter   Procedures    CT Lumbar Spine Without Contrast    CT Pelvis Without Contrast       Medical Decision Making:  ED Course as of 01/26/25 0702   Sat Jan 25, 2025   0532 Reassessed patient.  Pain is somewhat improved after Norco.  She is resting comfortably on stretcher.  Awaiting results of CT scans [MP]   0630 After rolling over in stretcher,  patient reports she has had no pain relief.  Has been taking 200 mg ibuprofen and 's old Flexeril prescription at home.  Will trial Toradol and dose of Robaxin here.  Will plan to reassess patient [MP]   0801 Pt states no relief with toradol and robaxin. Offered admission for further symptom management but she declines and would like to be discharged home. She feels the Norco was most effective. Discussed follow up and ER return precautions. [DC]      ED Course User Index  [DC] Helene Greenberg PA  [MP] Oralia Mercado, PAEfrainC       Differential diagnosis:  Fracture, sprain, strain    Diagnosis  Final diagnoses:   Acute midline low back pain without sciatica   Sacral pain   Fall down stairs, initial encounter          Akil Kramer MD  01/25/25 0710       Akil Kramer MD  01/26/25 0702